# Patient Record
Sex: FEMALE | Race: WHITE | NOT HISPANIC OR LATINO | Employment: OTHER | URBAN - METROPOLITAN AREA
[De-identification: names, ages, dates, MRNs, and addresses within clinical notes are randomized per-mention and may not be internally consistent; named-entity substitution may affect disease eponyms.]

---

## 2019-06-04 ENCOUNTER — APPOINTMENT (OUTPATIENT)
Dept: GENERAL RADIOLOGY | Facility: HOSPITAL | Age: 74
End: 2019-06-04

## 2019-06-04 ENCOUNTER — APPOINTMENT (OUTPATIENT)
Dept: CT IMAGING | Facility: HOSPITAL | Age: 74
End: 2019-06-04

## 2019-06-04 ENCOUNTER — APPOINTMENT (OUTPATIENT)
Dept: MRI IMAGING | Facility: HOSPITAL | Age: 74
End: 2019-06-04

## 2019-06-04 ENCOUNTER — HOSPITAL ENCOUNTER (INPATIENT)
Facility: HOSPITAL | Age: 74
LOS: 3 days | Discharge: HOME | End: 2019-06-07
Attending: EMERGENCY MEDICINE | Admitting: INTERNAL MEDICINE

## 2019-06-04 DIAGNOSIS — I63.9 ACUTE CVA (CEREBROVASCULAR ACCIDENT) (HCC): Primary | ICD-10-CM

## 2019-06-04 DIAGNOSIS — Z74.09 IMPAIRED MOBILITY AND ADLS: ICD-10-CM

## 2019-06-04 DIAGNOSIS — Z78.9 IMPAIRED MOBILITY AND ADLS: ICD-10-CM

## 2019-06-04 DIAGNOSIS — Z74.09 IMPAIRED FUNCTIONAL MOBILITY, BALANCE, GAIT, AND ENDURANCE: ICD-10-CM

## 2019-06-04 PROBLEM — I10 ESSENTIAL HYPERTENSION: Status: ACTIVE | Noted: 2019-06-04

## 2019-06-04 PROBLEM — K51.919 ULCERATIVE COLITIS WITH COMPLICATION (HCC): Status: ACTIVE | Noted: 2019-06-04

## 2019-06-04 PROBLEM — E11.8 TYPE 2 DIABETES MELLITUS WITH COMPLICATION, WITHOUT LONG-TERM CURRENT USE OF INSULIN (HCC): Status: ACTIVE | Noted: 2019-06-04

## 2019-06-04 PROBLEM — I10 ESSENTIAL HYPERTENSION: Status: RESOLVED | Noted: 2019-06-04 | Resolved: 2019-06-04

## 2019-06-04 LAB
BASOPHILS # BLD AUTO: 0.11 10*3/MM3 (ref 0–0.2)
BASOPHILS NFR BLD AUTO: 1.3 % (ref 0–1.5)
BUN BLDA-MCNC: 19 MG/DL (ref 8–26)
CA-I BLDA-SCNC: 1.26 MMOL/L (ref 1.2–1.32)
CHLORIDE BLDA-SCNC: 103 MMOL/L (ref 98–109)
CO2 BLDA-SCNC: 28 MMOL/L (ref 24–29)
CREAT BLDA-MCNC: 1.3 MG/DL (ref 0.6–1.3)
DEPRECATED RDW RBC AUTO: 44.6 FL (ref 37–54)
EOSINOPHIL # BLD AUTO: 0.4 10*3/MM3 (ref 0–0.4)
EOSINOPHIL NFR BLD AUTO: 4.9 % (ref 0.3–6.2)
ERYTHROCYTE [DISTWIDTH] IN BLOOD BY AUTOMATED COUNT: 13.6 % (ref 12.3–15.4)
GLUCOSE BLDC GLUCOMTR-MCNC: 132 MG/DL (ref 70–130)
GLUCOSE BLDC GLUCOMTR-MCNC: 165 MG/DL (ref 70–130)
HCT VFR BLD AUTO: 39.3 % (ref 34–46.6)
HCT VFR BLDA CALC: 39 % (ref 38–51)
HGB BLD-MCNC: 13 G/DL (ref 12–15.9)
HGB BLDA-MCNC: 13.3 G/DL (ref 12–17)
HOLD SPECIMEN: NORMAL
HOLD SPECIMEN: NORMAL
IMM GRANULOCYTES # BLD AUTO: 0.04 10*3/MM3 (ref 0–0.05)
IMM GRANULOCYTES NFR BLD AUTO: 0.5 % (ref 0–0.5)
INR PPP: 1.2 (ref 0.8–1.2)
LYMPHOCYTES # BLD AUTO: 2.76 10*3/MM3 (ref 0.7–3.1)
LYMPHOCYTES NFR BLD AUTO: 33.5 % (ref 19.6–45.3)
MCH RBC QN AUTO: 29.5 PG (ref 26.6–33)
MCHC RBC AUTO-ENTMCNC: 33.1 G/DL (ref 31.5–35.7)
MCV RBC AUTO: 89.1 FL (ref 79–97)
MONOCYTES # BLD AUTO: 0.59 10*3/MM3 (ref 0.1–0.9)
MONOCYTES NFR BLD AUTO: 7.2 % (ref 5–12)
NEUTROPHILS # BLD AUTO: 4.33 10*3/MM3 (ref 1.7–7)
NEUTROPHILS NFR BLD AUTO: 52.6 % (ref 42.7–76)
PLATELET # BLD AUTO: 300 10*3/MM3 (ref 140–450)
PMV BLD AUTO: 9.8 FL (ref 6–12)
POTASSIUM BLDA-SCNC: 5.2 MMOL/L (ref 3.5–4.9)
PROTHROMBIN TIME: 13.8 SECONDS (ref 12.8–15.2)
RBC # BLD AUTO: 4.41 10*6/MM3 (ref 3.77–5.28)
SODIUM BLDA-SCNC: 141 MMOL/L (ref 138–146)
WBC NRBC COR # BLD: 8.23 10*3/MM3 (ref 3.4–10.8)
WHOLE BLOOD HOLD SPECIMEN: NORMAL
WHOLE BLOOD HOLD SPECIMEN: NORMAL

## 2019-06-04 PROCEDURE — 85610 PROTHROMBIN TIME: CPT

## 2019-06-04 PROCEDURE — 82962 GLUCOSE BLOOD TEST: CPT

## 2019-06-04 PROCEDURE — 93005 ELECTROCARDIOGRAM TRACING: CPT | Performed by: EMERGENCY MEDICINE

## 2019-06-04 PROCEDURE — 0042T HC CT CEREBRAL PERFUSION W/WO CONTRAST: CPT

## 2019-06-04 PROCEDURE — 25010000002 ALTEPLASE PER 1 MG: Performed by: EMERGENCY MEDICINE

## 2019-06-04 PROCEDURE — 71045 X-RAY EXAM CHEST 1 VIEW: CPT

## 2019-06-04 PROCEDURE — 85025 COMPLETE CBC W/AUTO DIFF WBC: CPT | Performed by: EMERGENCY MEDICINE

## 2019-06-04 PROCEDURE — 80047 BASIC METABLC PNL IONIZED CA: CPT

## 2019-06-04 PROCEDURE — 63710000001 INSULIN LISPRO (HUMAN) PER 5 UNITS: Performed by: NURSE PRACTITIONER

## 2019-06-04 PROCEDURE — 70450 CT HEAD/BRAIN W/O DYE: CPT

## 2019-06-04 PROCEDURE — 99285 EMERGENCY DEPT VISIT HI MDM: CPT

## 2019-06-04 PROCEDURE — 99291 CRITICAL CARE FIRST HOUR: CPT | Performed by: INTERNAL MEDICINE

## 2019-06-04 PROCEDURE — 3E03317 INTRODUCTION OF OTHER THROMBOLYTIC INTO PERIPHERAL VEIN, PERCUTANEOUS APPROACH: ICD-10-PCS | Performed by: EMERGENCY MEDICINE

## 2019-06-04 PROCEDURE — 70551 MRI BRAIN STEM W/O DYE: CPT

## 2019-06-04 PROCEDURE — 0 IOPAMIDOL PER 1 ML: Performed by: EMERGENCY MEDICINE

## 2019-06-04 PROCEDURE — 85014 HEMATOCRIT: CPT

## 2019-06-04 RX ORDER — ACETAMINOPHEN 325 MG/1
650 TABLET ORAL EVERY 4 HOURS PRN
Status: DISCONTINUED | OUTPATIENT
Start: 2019-06-04 | End: 2019-06-07

## 2019-06-04 RX ORDER — GEMFIBROZIL 600 MG/1
600 TABLET, FILM COATED ORAL
COMMUNITY
End: 2019-06-07 | Stop reason: HOSPADM

## 2019-06-04 RX ORDER — BISACODYL 10 MG
10 SUPPOSITORY, RECTAL RECTAL DAILY PRN
Status: DISCONTINUED | OUTPATIENT
Start: 2019-06-04 | End: 2019-06-07

## 2019-06-04 RX ORDER — DOCUSATE SODIUM 100 MG/1
100 CAPSULE, LIQUID FILLED ORAL DAILY
Status: DISCONTINUED | OUTPATIENT
Start: 2019-06-04 | End: 2019-06-05

## 2019-06-04 RX ORDER — HYOSCYAMINE SULFATE EXTENDED-RELEASE 0.38 MG/1
0.38 TABLET ORAL DAILY
Status: ON HOLD | COMMUNITY
End: 2019-06-05

## 2019-06-04 RX ORDER — MEMANTINE HYDROCHLORIDE 10 MG/1
10 TABLET ORAL 2 TIMES DAILY
COMMUNITY

## 2019-06-04 RX ORDER — SODIUM CHLORIDE 9 MG/ML
100 INJECTION, SOLUTION INTRAVENOUS ONCE
Status: DISCONTINUED | OUTPATIENT
Start: 2019-06-04 | End: 2019-06-07 | Stop reason: HOSPADM

## 2019-06-04 RX ORDER — SODIUM CHLORIDE 0.9 % (FLUSH) 0.9 %
10 SYRINGE (ML) INJECTION AS NEEDED
Status: DISCONTINUED | OUTPATIENT
Start: 2019-06-04 | End: 2019-06-07

## 2019-06-04 RX ORDER — NICOTINE POLACRILEX 4 MG
15 LOZENGE BUCCAL
Status: DISCONTINUED | OUTPATIENT
Start: 2019-06-04 | End: 2019-06-05

## 2019-06-04 RX ORDER — METOPROLOL TARTRATE 50 MG/1
50 TABLET, FILM COATED ORAL 2 TIMES DAILY
COMMUNITY

## 2019-06-04 RX ORDER — TIMOLOL MALEATE 5 MG/ML
1 SOLUTION/ DROPS OPHTHALMIC NIGHTLY
COMMUNITY

## 2019-06-04 RX ORDER — ALUMINA, MAGNESIA, AND SIMETHICONE 2400; 2400; 240 MG/30ML; MG/30ML; MG/30ML
7.5 SUSPENSION ORAL EVERY 4 HOURS PRN
Status: DISCONTINUED | OUTPATIENT
Start: 2019-06-04 | End: 2019-06-05

## 2019-06-04 RX ORDER — ONDANSETRON 2 MG/ML
4 INJECTION INTRAMUSCULAR; INTRAVENOUS EVERY 6 HOURS PRN
Status: DISCONTINUED | OUTPATIENT
Start: 2019-06-04 | End: 2019-06-07

## 2019-06-04 RX ORDER — ALBUTEROL SULFATE 90 UG/1
2 AEROSOL, METERED RESPIRATORY (INHALATION) EVERY 4 HOURS PRN
COMMUNITY

## 2019-06-04 RX ORDER — ATORVASTATIN CALCIUM 40 MG/1
80 TABLET, FILM COATED ORAL NIGHTLY
Status: DISCONTINUED | OUTPATIENT
Start: 2019-06-04 | End: 2019-06-07 | Stop reason: HOSPADM

## 2019-06-04 RX ORDER — ASPIRIN 325 MG
325 TABLET ORAL DAILY
Status: DISCONTINUED | OUTPATIENT
Start: 2019-06-05 | End: 2019-06-07 | Stop reason: HOSPADM

## 2019-06-04 RX ORDER — ACETAMINOPHEN 650 MG/1
650 SUPPOSITORY RECTAL EVERY 4 HOURS PRN
Status: DISCONTINUED | OUTPATIENT
Start: 2019-06-04 | End: 2019-06-05

## 2019-06-04 RX ORDER — DIAZEPAM 5 MG/1
5 TABLET ORAL NIGHTLY PRN
COMMUNITY

## 2019-06-04 RX ORDER — SODIUM CHLORIDE 0.9 % (FLUSH) 0.9 %
3-10 SYRINGE (ML) INJECTION AS NEEDED
Status: DISCONTINUED | OUTPATIENT
Start: 2019-06-04 | End: 2019-06-07

## 2019-06-04 RX ORDER — ASPIRIN 600 MG/1
300 SUPPOSITORY RECTAL DAILY
Status: DISCONTINUED | OUTPATIENT
Start: 2019-06-05 | End: 2019-06-07

## 2019-06-04 RX ORDER — DEXTROSE MONOHYDRATE 25 G/50ML
25 INJECTION, SOLUTION INTRAVENOUS
Status: DISCONTINUED | OUTPATIENT
Start: 2019-06-04 | End: 2019-06-05

## 2019-06-04 RX ORDER — PHENOL 1.4 %
1200 AEROSOL, SPRAY (ML) MUCOUS MEMBRANE DAILY
COMMUNITY

## 2019-06-04 RX ORDER — TIMOLOL MALEATE 5 MG/ML
1 SOLUTION/ DROPS OPHTHALMIC DAILY
Status: DISCONTINUED | OUTPATIENT
Start: 2019-06-05 | End: 2019-06-07 | Stop reason: HOSPADM

## 2019-06-04 RX ORDER — SODIUM CHLORIDE 0.9 % (FLUSH) 0.9 %
3 SYRINGE (ML) INJECTION EVERY 12 HOURS SCHEDULED
Status: DISCONTINUED | OUTPATIENT
Start: 2019-06-04 | End: 2019-06-07 | Stop reason: HOSPADM

## 2019-06-04 RX ORDER — CHLORDIAZEPOXIDE HYDROCHLORIDE AND CLIDINIUM BROMIDE 5; 2.5 MG/1; MG/1
1 CAPSULE ORAL 2 TIMES DAILY
Status: ON HOLD | COMMUNITY
End: 2019-06-05

## 2019-06-04 RX ADMIN — TIMOLOL MALEATE 1 DROP: 5 SOLUTION/ DROPS OPHTHALMIC at 23:07

## 2019-06-04 RX ADMIN — SODIUM CHLORIDE, PRESERVATIVE FREE 3 ML: 5 INJECTION INTRAVENOUS at 23:08

## 2019-06-04 RX ADMIN — INSULIN LISPRO 2 UNITS: 100 INJECTION, SOLUTION INTRAVENOUS; SUBCUTANEOUS at 23:06

## 2019-06-04 RX ADMIN — SODIUM CHLORIDE 1000 ML: 9 INJECTION, SOLUTION INTRAVENOUS at 18:26

## 2019-06-04 RX ADMIN — IOPAMIDOL 40 ML: 755 INJECTION, SOLUTION INTRAVENOUS at 17:43

## 2019-06-04 RX ADMIN — ALTEPLASE 5.09 MG: KIT at 17:59

## 2019-06-04 RX ADMIN — ALTEPLASE 45.85 MG: KIT at 18:00

## 2019-06-04 RX ADMIN — ATORVASTATIN CALCIUM 80 MG: 40 TABLET, FILM COATED ORAL at 23:06

## 2019-06-04 NOTE — ED PROVIDER NOTES
Subjective   Isabel Neil is a 74 y.o.female who presents to the ED with complaints of a neurologic problem. Her last time known well is 1615. During this time, she developed right sided weakness and difficulty speaking. En route to the emergency department, EMS reports her symptoms began to improve. She has not experienced any facial asymmetry. There are no other complaints at this time.         History provided by:  Patient and EMS personnel  Neurologic Problem   The patient's primary symptoms include weakness (right sided). This is a new problem. The current episode started today. The neurological problem developed suddenly. The last time the patient was known to be well was 6/4/2019 4:15 PM.  The problem has been gradually improving since onset. There was right-sided, lower extremity and upper extremity focality noted. Past treatments include nothing. The treatment provided no relief.       Review of Systems   Neurological: Positive for speech difficulty and weakness (right sided). Negative for facial asymmetry.   All other systems reviewed and are negative.      Past Medical History:   Diagnosis Date   • Anxiety    • Blindness of right eye     Post trauma   • Colon polyp    • COPD (chronic obstructive pulmonary disease) (CMS/HCC)    • CVA (cerebral vascular accident) (CMS/HCC) 6/4/2019   • Dementia    • Depression    • Diabetes mellitus (CMS/HCC)    • Dyslipidemia    • History of irregular heartbeat    • Ulcerative colitis (CMS/HCC)        Allergies   Allergen Reactions   • Sulfa Antibiotics Unknown (See Comments)     Unknown        Past Surgical History:   Procedure Laterality Date   • COLONOSCOPY W/ POLYPECTOMY  2018   • HYSTERECTOMY         Family History   Problem Relation Age of Onset   • Alzheimer's disease Mother    • Diabetes Mother    • Breast cancer Sister    • Heart attack Brother    • Alzheimer's disease Maternal Grandmother    • Coronary artery disease Sister    • Diabetes Sister    • Valvular  heart disease Sister    • Lung disease Brother    • Melanoma Brother    • No Known Problems Brother        Social History     Socioeconomic History   • Marital status:      Spouse name: Not on file   • Number of children: 2   • Years of education: Not on file   • Highest education level: Not on file   Occupational History   • Occupation: Retired    Tobacco Use   • Smoking status: Former Smoker     Packs/day: 1.00     Years: 50.00     Pack years: 50.00     Types: Cigarettes     Last attempt to quit: 6/4/2009     Years since quitting: 10.0   • Smokeless tobacco: Never Used   Substance and Sexual Activity   • Alcohol use: No     Frequency: Never   Social History Narrative    Lives with her significant other.  Has a son and a daughter         Objective   Physical Exam   Constitutional: She is oriented to person, place, and time. She appears well-developed and well-nourished. No distress.   HENT:   Head: Normocephalic and atraumatic.   Nose: Nose normal.   Eyes: Conjunctivae are normal. No scleral icterus.   Neck: Normal range of motion. Neck supple.   Cardiovascular: Normal rate, regular rhythm, normal heart sounds and intact distal pulses.   No murmur heard.  Pulmonary/Chest: Effort normal and breath sounds normal. No respiratory distress.   Abdominal: Soft. Bowel sounds are normal. There is no tenderness.   Musculoskeletal: Normal range of motion. She exhibits no edema.   Neurological: She is alert and oriented to person, place, and time.   Weak  to right upper extremity. Poor coordination with finger to nose on right side. Unable to raise right lower extremity up off the bed.    Skin: Skin is warm and dry.   Psychiatric: She has a normal mood and affect. Her behavior is normal.   Nursing note and vitals reviewed.      Critical Care  Performed by: Nathan Fam MD  Authorized by: Christo Wooten MD     Critical care provider statement:     Critical care time (minutes):  35    Critical care  was necessary to treat or prevent imminent or life-threatening deterioration of the following conditions:  CNS failure or compromise    Critical care was time spent personally by me on the following activities:  Discussions with consultants, evaluation of patient's response to treatment, examination of patient, re-evaluation of patient's condition, ordering and review of radiographic studies, ordering and review of laboratory studies and ordering and performing treatments and interventions             ED Course  ED Course as of Jun 06 2310   Tue Jun 04, 2019   1721 Bedside evaluating the patient.   [TB]   1724 Her last time known normal is 1615.   [TB]   1729 Reviewed CT with radiology at the bedside.   [TB]   1738 I spoke with Mrs. Neil about risks and benefits of TPA and she wants me to talk to her daughter who is a nurse prior to giving consent.  She tells me her daughter is on the way here.  I have gone to our lobby and called for her daughter and she is not yet here.  While we are awaiting her arrival I have paged to Dr. Ko who is on-call for the stroke team.  At this point I think she would be a good candidate for IV TPA.  [DT]   1740 I have reviewed bedside labs and GFR is 39, I have asked our CT tech to perform a perfusion study  [DT]   1743 Discussed with Dr. Ko, neurology, who recommends tPA.   [TB]   1746 I spoke with Mrs. Neil further.  She denies any surgeries or injections to her spine within the last several months.  She denies blood in her urine or stool.  She tells me she has had problems with anemia however today her hemoglobin is 13.  [DT]   1746 Her daughter is here and is a nurse.  I spoken with her and she asks that we go ahead and give TPA.  I have instructed the nurses to give it  [DT]   1747 Discussed with Mrs. Neil's daughter, who consents to tPA. She also confirms her last known well of 1615.   [TB]   1808 CT perfusion study shows no large vessel occlusion.  TPA is  infusing.  I have paged Dr. Liam Watt who is on-call for the ICU  [DT]   1815 Repeat exam Mrs. Neil is able to perform finger-to-nose testing with the right upper extremity and is more coordinated than on my initial exam.  She still is unable to raise her right leg off the bed.  She is tolerating the TPA infusion.  Pressure is 110, will bolus a liter of fluids  [DT]   1816 Thrombolytic Therapy for Stroke  Time: 6:18 PM    Inclusion criteria:  Clinical diagnosis of ischemic stroke causing measurable neurologic deficit. Onset of symptoms < 4.5 hours before beginning treatment. Last known well time < 4.5 hours before beginning treatment. Pt is at least 18 years of age.    Exclusion criteria:  Historical: None.  Clinical: None.  Hematologic: None.  Head CT scan: None.  Relative exclusion criteria: None.  Additional relative exclusion criteria for treatment from 3-4.5 hours from symptom onset: None.    No exclusion criteria present.  Current inclusion criteria have been reviewed and met.    tPA given. Intensive monitoring performed:  blood pressure, cardiac monitoring, O2 sat and neuro exam.  Complications: None.   [TB]   1822 D/w dr mon who will admit  [DT]      ED Course User Index  [DT] Ntahan Fam MD  [TB] Brandon Sahni     Recent Results (from the past 24 hour(s))   Basic Metabolic Panel    Collection Time: 06/06/19  4:25 AM   Result Value Ref Range    Glucose 112 (H) 65 - 99 mg/dL    BUN 18 8 - 23 mg/dL    Creatinine 0.85 0.57 - 1.00 mg/dL    Sodium 142 136 - 145 mmol/L    Potassium 3.8 3.5 - 5.2 mmol/L    Chloride 104 98 - 107 mmol/L    CO2 25.0 22.0 - 29.0 mmol/L    Calcium 10.0 8.6 - 10.5 mg/dL    eGFR Non African Amer 65 >60 mL/min/1.73    BUN/Creatinine Ratio 21.2 7.0 - 25.0    Anion Gap 13.0 mmol/L   CBC Auto Differential    Collection Time: 06/06/19  4:25 AM   Result Value Ref Range    WBC 8.17 3.40 - 10.80 10*3/mm3    RBC 4.44 3.77 - 5.28 10*6/mm3    Hemoglobin 12.8 12.0 - 15.9 g/dL     Hematocrit 39.4 34.0 - 46.6 %    MCV 88.7 79.0 - 97.0 fL    MCH 28.8 26.6 - 33.0 pg    MCHC 32.5 31.5 - 35.7 g/dL    RDW 13.7 12.3 - 15.4 %    RDW-SD 44.5 37.0 - 54.0 fl    MPV 10.1 6.0 - 12.0 fL    Platelets 278 140 - 450 10*3/mm3    Neutrophil % 46.9 42.7 - 76.0 %    Lymphocyte % 36.6 19.6 - 45.3 %    Monocyte % 9.7 5.0 - 12.0 %    Eosinophil % 5.6 0.3 - 6.2 %    Basophil % 1.0 0.0 - 1.5 %    Immature Grans % 0.2 0.0 - 0.5 %    Neutrophils, Absolute 3.83 1.70 - 7.00 10*3/mm3    Lymphocytes, Absolute 2.99 0.70 - 3.10 10*3/mm3    Monocytes, Absolute 0.79 0.10 - 0.90 10*3/mm3    Eosinophils, Absolute 0.46 (H) 0.00 - 0.40 10*3/mm3    Basophils, Absolute 0.08 0.00 - 0.20 10*3/mm3    Immature Grans, Absolute 0.02 0.00 - 0.05 10*3/mm3   POC Glucose Once    Collection Time: 06/06/19  7:17 AM   Result Value Ref Range    Glucose 114 70 - 130 mg/dL   Troponin    Collection Time: 06/06/19 11:17 AM   Result Value Ref Range    Troponin T <0.010 0.000 - 0.030 ng/mL   POC Glucose Once    Collection Time: 06/06/19 11:25 AM   Result Value Ref Range    Glucose 117 70 - 130 mg/dL   Doppler Transcranial Microbubble Injection CAR    Collection Time: 06/06/19  4:30 PM   Result Value Ref Range    BSA 1.6 m^2     CV ECHO AJAY - BZI_BMI 20.6 kilograms/m^2     CV ECHO AJAY - BSA(HAYCOCK) 1.6 m^2     CV ECHO AJAY - BZI_METRIC_WEIGHT 56.2 kg     CV ECHO AJAY - BZI_METRIC_HEIGHT 165.1 cm    Left Terminal ICA 19 cm/sec    Left P1 22 cm/sec    Left distal M1 11 cm/sec    Left Mid M1 15 cm/sec    Left Proximal M1 17 cm/sec    Left P2 12 cm/sec   POC Glucose Once    Collection Time: 06/06/19  4:34 PM   Result Value Ref Range    Glucose 128 70 - 130 mg/dL   POC Glucose Once    Collection Time: 06/06/19  8:17 PM   Result Value Ref Range    Glucose 188 (H) 70 - 130 mg/dL     Note: In addition to lab results from this visit, the labs listed above may include labs taken at another facility or during a different encounter within the last 24  hours. Please correlate lab times with ED admission and discharge times for further clarification of the services performed during this visit.    CT Head Without Contrast   Final Result   Chronic appearing changes of the aging brain, including   white matter disease similar to previous brain MRI.. No evidence of   acute intracranial disease.           This report was finalized on 6/5/2019 6:30 PM by DR. Giovani Blanton MD.          CT Cerebral Perfusion With & Without Contrast   Final Result   There is no evidence of core infarct or reversible ischemia.       D:  06/04/2019   E:  06/05/2019           This report was finalized on 6/5/2019 9:21 AM by Dr. Geoffrey Sosa MD.          XR Chest 1 View   Final Result   Chronic change; no acute disease.       D:  06/04/2019   E:  06/05/2019       This report was finalized on 6/5/2019 9:21 AM by Dr. Geoffrey Sosa MD.          MRI Brain Without Contrast   Final Result   Moderate atrophy with moderate chronic ischemic changes around the ventricles. No evidence of recent infarct.      Signer Name: Nawaf Malin MD    Signed: 6/4/2019 9:50 PM    Workstation Name: [id number]          CT Head Without Contrast Stroke Protocol   Final Result   Age-appropriate atrophy and fairly extensive but chronic   appearing central white matter changes. No evidence of intracranial   hemorrhage, no well-defined infarct, or other clearly acute renal   disease.               Note: Exam time is shown as 1727; study was reviewed the CT scan monitor   and discussed with Dr. Fam at 1731.           This report was finalized on 6/4/2019 9:22 PM by DR. Giovani Blanton MD.            Vitals:    06/06/19 1800 06/06/19 1900 06/06/19 2000 06/06/19 2200   BP: 124/71 151/75 122/63 141/78   BP Location:   Right arm Right arm   Patient Position:   Lying Lying   Pulse: 95 112 105 75   Resp:   16    Temp:   98.6 °F (37 °C)    TempSrc:   Oral    SpO2: 93% 93% 93% 95%   Weight:       Height:         Medications   sodium  chloride 0.9 % flush 10 mL (not administered)   sodium chloride 0.9 % infusion (not administered)   timolol (TIMOPTIC) 0.5 % ophthalmic solution 1 drop (1 drop Both Eyes Given 6/6/19 2002)   sodium chloride 0.9 % flush 3 mL (3 mL Intravenous Given 6/6/19 2003)   sodium chloride 0.9 % flush 3-10 mL (not administered)   atorvastatin (LIPITOR) tablet 80 mg (80 mg Oral Given 6/6/19 2002)   aspirin tablet 325 mg (325 mg Oral Given 6/6/19 0812)     Or   aspirin suppository 300 mg ( Rectal Not Given:  See Alt 6/6/19 0812)   acetaminophen (TYLENOL) tablet 650 mg (not administered)   ondansetron (ZOFRAN) injection 4 mg (not administered)   bisacodyl (DULCOLAX) suppository 10 mg (not administered)   insulin lispro (humaLOG) injection 0-7 Units (2 Units Subcutaneous Given 6/6/19 2018)   sertraline (ZOLOFT) tablet 50 mg (50 mg Oral Given 6/6/19 0812)   metoprolol tartrate (LOPRESSOR) tablet 50 mg (50 mg Oral Given 6/6/19 2002)   insulin detemir (LEVEMIR) injection 15 Units (15 Units Subcutaneous Given 6/6/19 2002)   insulin lispro (humaLOG) injection 5 Units (5 Units Subcutaneous Given 6/6/19 1738)   iopamidol (ISOVUE-370) 76 % injection 150 mL (40 mL Intravenous Given 6/4/19 1743)   alteplase (ACTIVASE) bolus from vial (5.09 mg Intravenous Given 6/4/19 1759)   alteplase (ACTIVASE) 100 mg kit (0 mg/kg × 56.6 kg Intravenous Stopped 6/4/19 1847)   sodium chloride 0.9 % bolus 1,000 mL (0 mL Intravenous Stopped 6/4/19 1910)     ECG/EMG Results (last 24 hours)     ** No results found for the last 24 hours. **        ECG 12 Lead                             MDM  Number of Diagnoses or Management Options  Acute CVA (cerebrovascular accident) (CMS/HCC): new and requires workup     Amount and/or Complexity of Data Reviewed  Clinical lab tests: reviewed and ordered  Tests in the radiology section of CPT®: ordered and reviewed  Discussion of test results with the performing providers: yes  Review and summarize past medical records:  yes  Discuss the patient with other providers: yes  Independent visualization of images, tracings, or specimens: yes    Critical Care  Total time providing critical care: 30-74 minutes    Patient Progress  Patient progress: improved      Final diagnoses:   Acute CVA (cerebrovascular accident) (CMS/[facility], located within [facility])       Documentation assistance provided by marcello Sahni.  Information recorded by the scribe was done at my direction and has been verified and validated by me.     Brandon Sahni  06/04/19 2876       Nathan Fam MD  06/06/19 [id number]

## 2019-06-04 NOTE — H&P
ICU ADMISSION NOTE    Chief complaint     Right-sided weakness  Ischemic stroke      Subjective     Patient is a 74 y.o. female presents with onset of right-sided weakness and difficulty speaking while standing at the sink washing dishes.  Everybody was ready to sit down at the table but she could not move from the sink because she could not move her leg.  Her significant other helped her to the chair and an ambulance was called.  Onset of symptoms around 1615.  EMS reported her symptoms began to improve.  CT scan of the head, CT perfusion negative for a well-defined infarct.  She had extensive central white matter disease.  She could not raise her right leg and her right upper extremity was weak.  TPA initiated at 1759.  Patient ready feeling improved strength in her upper extremity.  Speech has cleared.    Review of Systems  Review of Systems   Constitutional: Positive for activity change.   HENT: Positive for congestion and postnasal drip. Negative for trouble swallowing.    Eyes: Positive for visual disturbance.        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    Cardiovascular: Negative for chest pain, palpitations and leg swelling.   Gastrointestinal: Positive for diarrhea. Negative for abdominal pain.   Endocrine: Negative for cold intolerance and polyuria.   Genitourinary: Negative for dysuria.   Musculoskeletal: Positive for arthralgias and gait problem.   Skin: Negative.    Allergic/Immunologic: Positive for environmental allergies.   Neurological: Positive for speech difficulty and weakness.   Hematological: Negative for adenopathy. Does not bruise/bleed easily.   Psychiatric/Behavioral: Positive for dysphoric mood. The patient is nervous/anxious.         Home Medications    (Not in a hospital admission)  albuterol sulfate  (90 Base) MCG/ACT inhaler    --  --     calcium carbonate (OS-VU) 600 MG tablet    --  --    clidinium-chlordiazePOXIDE  "(LIBRAX) 5-2.5 MG per capsule    --  --    diazePAM (VALIUM) 5 MG tablet    --  --    gemfibrozil (LOPID) 600 MG tablet    --  --    hyoscyamine (LEVBID) 0.375 MG 12 hr tablet    --  --    memantine (NAMENDA) 10 MG tablet    --  --    metFORMIN (GLUCOPHAGE) 1000 MG tablet    --  --    metoprolol tartrate (LOPRESSOR) 50 MG tablet    --  --    nystatin (MYCOSTATIN) 167698 UNIT/ML suspension    --  --    sertraline (ZOLOFT) 50 MG tablet    --  --    timolol (TIMOPTIC) 0.5 % ophthalmic solution           History  Past Medical History:   Diagnosis Date   • Anxiety    • Blindness of right eye     Post trauma   • Colon polyp    • COPD (chronic obstructive pulmonary disease) (CMS/[facility])    • CVA (cerebral vascular accident) (CMS/[facility]) 6/4/2019   • Depression    • Diabetes mellitus (CMS/[facility])    • History of irregular heartbeat    • Ulcerative colitis (CMS/[facility])      Past Surgical History:   Procedure Laterality Date   • COLONOSCOPY W/ POLYPECTOMY  2018   • HYSTERECTOMY       Family History   Problem Relation Age of Onset   • Alzheimer's disease Mother    • Diabetes Mother    • Breast cancer Sister    • Heart attack Brother    • Alzheimer's disease Maternal Grandmother    • Coronary artery disease Sister    • Diabetes Sister    • Valvular heart disease Sister    • Lung disease Brother    • Melanoma Brother    • No Known Problems Brother      Social History     Tobacco Use   • Smoking status: Former Smoker     Packs/day: 1.00     Years: 50.00     Pack years: 50.00     Types: Cigarettes     Last attempt to quit: 6/4/2009     Years since quitting: 10.0   • Smokeless tobacco: Never Used   Substance Use Topics   • Alcohol use: No     Frequency: Never   • Drug use: Not on file       (Not in a hospital admission)  Allergies:  Sulfa antibiotics      Objective     Vital Signs  Blood pressure 158/86, pulse 102, temperature 99.3 °F (37.4 °C), resp. rate 16, height 165.1 cm (65\"), weight 56.6 kg (124 lb 12.5 oz), SpO2 93 %.    Physical " Exam:  General Appearance:   Thin elderly white woman in no distress   Head:   Normocephalic, atraumatic   Eyes:          Right pupil dilated, right eye deviates laterally, left pupil 4 mm and reactive   Ears:     Throat:  Oral mucosa dry   Neck:  Trachea midline, no carotid bruit   Back:      Lungs:    Symmetric chest expansion without wheeze or rhonchi    Heart:   Regular, S1, S2 auscultated, soft systolic murmur   Abdomen:    Bowel sounds present, nondistended, nontender   Rectal:     Deferred   Extremities:    No pitting edema or cyanosis.  No calf tenderness   Pulses:  Pedal pulses present   Skin:  Warm and dry   Lymph nodes:  No cervical adenopathy   Neurologic:  Alert, oriented to person, hospital, year.  Face symmetric, tongue midline, no sensory deficit.  No motor drift upper extremities.  Unable to raise her right leg off of the stretcher, normal strength left leg.  Speech fluent       Results Review:   Lab Results (last 24 hours)     Procedure Component Value Units Date/Time    [place] Draw [[id number]] Collected:  06/04/19 1738    Specimen:  Blood Updated:  06/04/19 5065    Narrative:       The following orders were created for panel order [place] Draw.  Procedure                               Abnormality         Status                     ---------                               -----------         ------                     Light Blue Top[[id number]]                                   Final result               Green Top (Gel)[[id number]]                                  Final result               Lavender Top[[id number]]                                     Final result               Gold Top - SST[[id number]]                                   Final result               Green Top (No Gel)[[id number]]                                                            Please view results for these tests on the individual orders.    Light Blue Top [[id number]] Collected:  06/04/19 1738    Specimen:  Blood Updated:   06/04/19 1845     Extra Tube hold for add-on     Comment: Auto resulted       Green Top (Gel) [[id number]] Collected:  06/04/19 1739    Specimen:  Blood Updated:  06/04/19 1845     Extra Tube Hold for add-ons.     Comment: Auto resulted.       Lavender Top [[id number]] Collected:  06/04/19 1739    Specimen:  Blood Updated:  06/04/19 1845     Extra Tube hold for add-on     Comment: Auto resulted       Gold Top - SST [[id number]] Collected:  06/04/19 1739    Specimen:  Blood Updated:  06/04/19 1845     Extra Tube Hold for add-ons.     Comment: Auto resulted.       CBC & Differential [[id number]] Collected:  06/04/19 1739    Specimen:  Blood Updated:  06/04/19 1752    Narrative:       The following orders were created for panel order CBC & Differential.  Procedure                               Abnormality         Status                     ---------                               -----------         ------                     CBC Auto Differential[[id number]]        Normal              Final result                 Please view results for these tests on the individual orders.    CBC Auto Differential [[id number]]  (Normal) Collected:  06/04/19 1739    Specimen:  Blood Updated:  06/04/19 1752     WBC 8.23 10*3/mm3      RBC 4.41 10*6/mm3      Hemoglobin 13.0 g/dL      Hematocrit 39.3 %      MCV 89.1 fL      MCH 29.5 pg      MCHC 33.1 g/dL      RDW 13.6 %      RDW-SD 44.6 fl      MPV 9.8 fL      Platelets 300 10*3/mm3      Neutrophil % 52.6 %      Lymphocyte % 33.5 %      Monocyte % 7.2 %      Eosinophil % 4.9 %      Basophil % 1.3 %      Immature Grans % 0.5 %      Neutrophils, Absolute 4.33 10*3/mm3      Lymphocytes, Absolute 2.76 10*3/mm3      Monocytes, Absolute 0.59 10*3/mm3      Eosinophils, Absolute 0.40 10*3/mm3      Basophils, Absolute 0.11 10*3/mm3      Immature Grans, Absolute 0.04 10*3/mm3     POC Protime / INR [[id number]]  (Normal) Collected:  06/04/19 1735    Specimen:  Blood Updated:  06/04/19 1741     Protime 13.8  seconds      INR 1.2     Comment: Serial Number: [id number]:  [id number]       POC CHEM 8 [[id number]]  (Abnormal) Collected:  06/04/19 1736    Specimen:  Blood Updated:  06/04/19 1740     Glucose 132 mg/dL      BUN 19 mg/dL      Creatinine 1.30 mg/dL      Sodium 141 mmol/L      Potassium 5.2 mmol/L      Chloride 103 mmol/L      Total CO2 28 mmol/L      Hemoglobin 13.3 g/dL      Comment: Serial Number: [id number]:  [id number]        Hematocrit 39 %      Ionized Calcium 1.26 mmol/L         Imaging Results (last 24 hours)     Procedure Component Value Units Date/Time    XR Chest 1 View [[id number]] Collected:  06/04/19 1814     Updated:  06/04/19 1815    Narrative:          EXAMINATION: XR CHEST 1 VW-      INDICATION: Stroke Protocol (Onset > 12 Hrs); I63.9-Cerebral infarction,  unspecified      COMPARISON: NONE     FINDINGS: There is a normal cardiac silhouette. There is linear change  in the right midlung region with hilar and parenchymal calcifications  consistent with old granulomatous disease. There is no acute  inflammatory process, mass or effusion.           Impression:       Chronic change; no acute disease          CT Cerebral Perfusion With & Without Contrast [[id number]] Collected:  06/04/19 1752     Updated:  06/04/19 1754    Narrative:       EXAMINATION: CT CEREBRAL PERFUSION W WO CONTRAST-      INDICATION: TIA, initial screening; I63.9-Cerebral infarction,  unspecified     TECHNIQUE: CT perfusion imaging was performed with without intravenous  contrast with data acquisition regarding blood flow, blood volume and  transit time.     The radiation dose reduction device was turned on for each scan per the  ALARA (As Low as Reasonably Achievable) protocol.     COMPARISON: NONE     FINDINGS: Blood flow images demonstrate no abnormality. Likewise the  MAXIMUM TEMPERATURE and blood volume images are normal.             Impression:       There is no evidence of core infarct or reversible ischemia.          CT Head  Without Contrast Stroke Protocol [[id number]] Collected:  06/04/19 1733     Updated:  06/04/19 1734    Narrative:       EXAMINATION: CT HEAD WO CONTRAST STROKE PROTOCOL-      INDICATION: Stroke     TECHNIQUE: 5 mm unenhanced images through the brain     The radiation dose reduction device was turned on for each scan per the  ALARA (As Low as Reasonably Achievable) protocol.     COMPARISON: NONE     FINDINGS: Patient history indicates right-sided weakness. The calvarium  appears intact. Included paranasal sinuses mastoids appear clear. Soft  tissue window images show expected degree of generalized cerebral  atrophy for age. There is fairly extensive central white matter disease,  slightly denser in the central right frontal white matter, and posterior  left frontal white matter than elsewhere, but there is no well-defined  infarct, no evidence of hemorrhage, hydrocephalus, mass or mass effect,  or abnormal extra-axial collection.                   Impression:       Age-appropriate atrophy and fairly extensive but chronic  appearing central white matter changes. No evidence of intracranial  hemorrhage, no well-defined infarct, or other clearly acute renal  disease.           Note: Exam time is shown as 17 2017 study was reviewed the CT scan  monitor and discussed with Dr. Fam at 1731.                 PROBLEM LIST    Patient Active Problem List   Diagnosis   • CVA (cerebral vascular accident) (CMS/HCC)   • Type 2 diabetes mellitus with complication, without long-term current use of insulin (CMS/HCC)   • Ulcerative colitis with complication (CMS/HCC)       Assessment/Plan     74-year-old woman with diabetes, ulcerative colitis with chronic diarrhea, anxiety on chronic benzo diazepam presenting with the abrupt onset of right-sided weakness.  She received TPA.  CT scan did not reveal any large vessel occlusion.  Risk factors include previous tobacco use, diabetes, possible dyslipidemia as she takes a cholesterol  medication.  Neurologic symptoms have already started to improve particularly in her upper extremity.  Blood glucose on admission was 132.  She was taking metformin.  She denied a history of high blood pressure but does take metoprolol    Monitor NIH stroke scale  Follow-up CT scan in 24 hours  Neurology to assess  Maintain systolic blood pressure less than 185  Sliding scale insulin  As needed nebulized bronchodilators  Dysphagia evaluation  Resume antidepressives and anxiety medications  Aspirin, statin  GI soft diet  Hemoglobin A1c  Jose Guadalupe Anders MD  06/04/19  7:37 PM    Time: 50min    This note was produced with a voice recognition program and may have uncorrected errors.

## 2019-06-05 ENCOUNTER — APPOINTMENT (OUTPATIENT)
Dept: CARDIOLOGY | Facility: HOSPITAL | Age: 74
End: 2019-06-05

## 2019-06-05 ENCOUNTER — APPOINTMENT (OUTPATIENT)
Dept: CT IMAGING | Facility: HOSPITAL | Age: 74
End: 2019-06-05

## 2019-06-05 PROBLEM — I63.9 ACUTE CVA (CEREBROVASCULAR ACCIDENT) (HCC): Status: ACTIVE | Noted: 2019-06-05

## 2019-06-05 LAB
ALBUMIN SERPL-MCNC: 4.3 G/DL (ref 3.5–5.2)
ALBUMIN/GLOB SERPL: 1.3 G/DL
ALP SERPL-CCNC: 104 U/L (ref 39–117)
ALT SERPL W P-5'-P-CCNC: 5 U/L (ref 1–33)
ANION GAP SERPL CALCULATED.3IONS-SCNC: 13 MMOL/L
AST SERPL-CCNC: 16 U/L (ref 1–32)
BH CV ECHO MEAS - AO MAX PG (FULL): 5.8 MMHG
BH CV ECHO MEAS - AO MAX PG: 9.2 MMHG
BH CV ECHO MEAS - AO ROOT AREA (BSA CORRECTED): 1.9
BH CV ECHO MEAS - AO ROOT AREA: 7.2 CM^2
BH CV ECHO MEAS - AO ROOT DIAM: 3 CM
BH CV ECHO MEAS - AO V2 MAX: 152 CM/SEC
BH CV ECHO MEAS - AVA(V,A): 1.6 CM^2
BH CV ECHO MEAS - AVA(V,D): 1.6 CM^2
BH CV ECHO MEAS - BSA(HAYCOCK): 1.6 M^2
BH CV ECHO MEAS - BSA(HAYCOCK): 1.6 M^2
BH CV ECHO MEAS - BSA: 1.6 M^2
BH CV ECHO MEAS - BSA: 1.7 M^2
BH CV ECHO MEAS - BZI_BMI: 18.3 KILOGRAMS/M^2
BH CV ECHO MEAS - BZI_BMI: 20.6 KILOGRAMS/M^2
BH CV ECHO MEAS - BZI_METRIC_HEIGHT: 165.1 CM
BH CV ECHO MEAS - BZI_METRIC_HEIGHT: 175.3 CM
BH CV ECHO MEAS - BZI_METRIC_WEIGHT: 56.2 KG
BH CV ECHO MEAS - BZI_METRIC_WEIGHT: 56.2 KG
BH CV ECHO MEAS - EDV(CUBED): 74.4 ML
BH CV ECHO MEAS - EDV(MOD-SP2): 35 ML
BH CV ECHO MEAS - EDV(MOD-SP4): 53 ML
BH CV ECHO MEAS - EDV(TEICH): 78.8 ML
BH CV ECHO MEAS - EF(CUBED): 67.4 %
BH CV ECHO MEAS - EF(MOD-BP): 67 %
BH CV ECHO MEAS - EF(MOD-SP2): 62.9 %
BH CV ECHO MEAS - EF(MOD-SP4): 71.7 %
BH CV ECHO MEAS - EF(TEICH): 59.3 %
BH CV ECHO MEAS - ESV(CUBED): 24.3 ML
BH CV ECHO MEAS - ESV(MOD-SP2): 13 ML
BH CV ECHO MEAS - ESV(MOD-SP4): 15 ML
BH CV ECHO MEAS - ESV(TEICH): 32.1 ML
BH CV ECHO MEAS - FS: 31.2 %
BH CV ECHO MEAS - IVS/LVPW: 1
BH CV ECHO MEAS - IVSD: 0.74 CM
BH CV ECHO MEAS - LAD MAJOR: 4.3 CM
BH CV ECHO MEAS - LAT PEAK E' VEL: 10.6 CM/SEC
BH CV ECHO MEAS - LATERAL E/E' RATIO: 10.9
BH CV ECHO MEAS - LV DIASTOLIC VOL/BSA (35-75): 32.8 ML/M^2
BH CV ECHO MEAS - LV MASS(C)D: 90.3 GRAMS
BH CV ECHO MEAS - LV MASS(C)DI: 56 GRAMS/M^2
BH CV ECHO MEAS - LV MAX PG: 3.4 MMHG
BH CV ECHO MEAS - LV SYSTOLIC VOL/BSA (12-30): 9.3 ML/M^2
BH CV ECHO MEAS - LV V1 MAX: 92.5 CM/SEC
BH CV ECHO MEAS - LVIDD: 4.2 CM
BH CV ECHO MEAS - LVIDS: 2.9 CM
BH CV ECHO MEAS - LVLD AP2: 7 CM
BH CV ECHO MEAS - LVLD AP4: 7.2 CM
BH CV ECHO MEAS - LVLS AP2: 6 CM
BH CV ECHO MEAS - LVLS AP4: 5.8 CM
BH CV ECHO MEAS - LVOT AREA (M): 2.5 CM^2
BH CV ECHO MEAS - LVOT AREA: 2.6 CM^2
BH CV ECHO MEAS - LVOT DIAM: 1.8 CM
BH CV ECHO MEAS - LVPWD: 0.72 CM
BH CV ECHO MEAS - MED PEAK E' VEL: 11.3 CM/SEC
BH CV ECHO MEAS - MEDIAL E/E' RATIO: 11.7
BH CV ECHO MEAS - MV DEC TIME: 0.15 SEC
BH CV ECHO MEAS - MV E MAX VEL: 118 CM/SEC
BH CV ECHO MEAS - PULM DIAS VEL: 42.8 CM/SEC
BH CV ECHO MEAS - PULM S/D: 1.6
BH CV ECHO MEAS - PULM SYS VEL: 67.9 CM/SEC
BH CV ECHO MEAS - SI(CUBED): 31 ML/M^2
BH CV ECHO MEAS - SI(MOD-SP2): 13.6 ML/M^2
BH CV ECHO MEAS - SI(MOD-SP4): 23.5 ML/M^2
BH CV ECHO MEAS - SI(TEICH): 29 ML/M^2
BH CV ECHO MEAS - SV(CUBED): 50.1 ML
BH CV ECHO MEAS - SV(MOD-SP2): 22 ML
BH CV ECHO MEAS - SV(MOD-SP4): 38 ML
BH CV ECHO MEAS - SV(TEICH): 46.7 ML
BH CV ECHO MEAS - TAPSE (>1.6): 1.3 CM2
BH CV ECHO MEASUREMENTS AVERAGE E/E' RATIO: 10.78
BH CV VAS BP LEFT ARM: NORMAL MMHG
BH CV XLRA - RV BASE: 2.8 CM
BH CV XLRA - RV LENGTH: 7.1 CM
BH CV XLRA - RV MID: 2.4 CM
BH CV XLRA - TDI S': 9.87 CM/SEC
BH CV XLRA MEAS LEFT CCA RATIO VEL: 58.5 CM/SEC
BH CV XLRA MEAS LEFT DIST CCA EDV: 14.9 CM/SEC
BH CV XLRA MEAS LEFT DIST CCA PSV: 55 CM/SEC
BH CV XLRA MEAS LEFT DIST ICA EDV: 24.9 CM/SEC
BH CV XLRA MEAS LEFT DIST ICA PSV: 86 CM/SEC
BH CV XLRA MEAS LEFT ICA RATIO VEL: 60.9 CM/SEC
BH CV XLRA MEAS LEFT ICA/CCA RATIO: 1
BH CV XLRA MEAS LEFT MID CCA EDV: 13.8 CM/SEC
BH CV XLRA MEAS LEFT MID CCA PSV: 58.9 CM/SEC
BH CV XLRA MEAS LEFT MID ICA EDV: 19.3 CM/SEC
BH CV XLRA MEAS LEFT MID ICA PSV: 58.1 CM/SEC
BH CV XLRA MEAS LEFT PROX CCA EDV: 12.2 CM/SEC
BH CV XLRA MEAS LEFT PROX CCA PSV: 50.7 CM/SEC
BH CV XLRA MEAS LEFT PROX ECA PSV: 141.4 CM/SEC
BH CV XLRA MEAS LEFT PROX ICA EDV: 14.9 CM/SEC
BH CV XLRA MEAS LEFT PROX ICA PSV: 61.3 CM/SEC
BH CV XLRA MEAS LEFT PROX SCLA PSV: 207.4 CM/SEC
BH CV XLRA MEAS LEFT VERTEBRAL A EDV: 20.1 CM/SEC
BH CV XLRA MEAS LEFT VERTEBRAL A PSV: 84.7 CM/SEC
BH CV XLRA MEAS RIGHT CCA RATIO VEL: 67.6 CM/SEC
BH CV XLRA MEAS RIGHT DIST CCA EDV: 16.1 CM/SEC
BH CV XLRA MEAS RIGHT DIST CCA PSV: 58.5 CM/SEC
BH CV XLRA MEAS RIGHT DIST ICA EDV: 24.8 CM/SEC
BH CV XLRA MEAS RIGHT DIST ICA PSV: 83.3 CM/SEC
BH CV XLRA MEAS RIGHT ICA RATIO VEL: 96.2 CM/SEC
BH CV XLRA MEAS RIGHT ICA/CCA RATIO: 1.4
BH CV XLRA MEAS RIGHT MID CCA EDV: 23.2 CM/SEC
BH CV XLRA MEAS RIGHT MID CCA PSV: 68 CM/SEC
BH CV XLRA MEAS RIGHT MID ICA EDV: 25.9 CM/SEC
BH CV XLRA MEAS RIGHT MID ICA PSV: 73.9 CM/SEC
BH CV XLRA MEAS RIGHT PROX CCA EDV: 16.1 CM/SEC
BH CV XLRA MEAS RIGHT PROX CCA PSV: 73.1 CM/SEC
BH CV XLRA MEAS RIGHT PROX ECA PSV: 93.7 CM/SEC
BH CV XLRA MEAS RIGHT PROX ICA EDV: 27 CM/SEC
BH CV XLRA MEAS RIGHT PROX ICA PSV: 96.8 CM/SEC
BH CV XLRA MEAS RIGHT PROX SCLA PSV: 43.2 CM/SEC
BH CV XLRA MEAS RIGHT VERTEBRAL A EDV: 16.7 CM/SEC
BH CV XLRA MEAS RIGHT VERTEBRAL A PSV: 60.4 CM/SEC
BILIRUB SERPL-MCNC: 0.4 MG/DL (ref 0.2–1.2)
BUN BLD-MCNC: 18 MG/DL (ref 8–23)
BUN/CREAT SERPL: 19.6 (ref 7–25)
CALCIUM SPEC-SCNC: 9.6 MG/DL (ref 8.6–10.5)
CHLORIDE SERPL-SCNC: 104 MMOL/L (ref 98–107)
CHOLEST SERPL-MCNC: 133 MG/DL (ref 0–200)
CO2 SERPL-SCNC: 25 MMOL/L (ref 22–29)
CREAT BLD-MCNC: 0.92 MG/DL (ref 0.57–1)
DEPRECATED RDW RBC AUTO: 45 FL (ref 37–54)
ERYTHROCYTE [DISTWIDTH] IN BLOOD BY AUTOMATED COUNT: 13.8 % (ref 12.3–15.4)
GFR SERPL CREATININE-BSD FRML MDRD: 59 ML/MIN/1.73
GLOBULIN UR ELPH-MCNC: 3.2 GM/DL
GLUCOSE BLD-MCNC: 130 MG/DL (ref 65–99)
GLUCOSE BLDC GLUCOMTR-MCNC: 123 MG/DL (ref 70–130)
GLUCOSE BLDC GLUCOMTR-MCNC: 130 MG/DL (ref 70–130)
GLUCOSE BLDC GLUCOMTR-MCNC: 172 MG/DL (ref 70–130)
GLUCOSE BLDC GLUCOMTR-MCNC: 179 MG/DL (ref 70–130)
GLUCOSE BLDC GLUCOMTR-MCNC: 204 MG/DL (ref 70–130)
HBA1C MFR BLD: 5.9 % (ref 4.8–5.6)
HCT VFR BLD AUTO: 36.9 % (ref 34–46.6)
HDLC SERPL-MCNC: 30 MG/DL (ref 40–60)
HGB BLD-MCNC: 12 G/DL (ref 12–15.9)
LDLC SERPL CALC-MCNC: 56 MG/DL (ref 0–100)
LDLC/HDLC SERPL: 1.87 {RATIO}
LV EF 2D ECHO EST: 65 %
MAXIMAL PREDICTED HEART RATE: 144 BPM
MCH RBC QN AUTO: 29.3 PG (ref 26.6–33)
MCHC RBC AUTO-ENTMCNC: 32.5 G/DL (ref 31.5–35.7)
MCV RBC AUTO: 90 FL (ref 79–97)
PLATELET # BLD AUTO: 265 10*3/MM3 (ref 140–450)
PMV BLD AUTO: 10.2 FL (ref 6–12)
POTASSIUM BLD-SCNC: 4 MMOL/L (ref 3.5–5.2)
PROT SERPL-MCNC: 7.5 G/DL (ref 6–8.5)
RBC # BLD AUTO: 4.1 10*6/MM3 (ref 3.77–5.28)
RIGHT ARM BP: NORMAL MMHG
SODIUM BLD-SCNC: 142 MMOL/L (ref 136–145)
STRESS TARGET HR: 122 BPM
TRIGL SERPL-MCNC: 235 MG/DL (ref 0–150)
VLDLC SERPL-MCNC: 47 MG/DL
WBC NRBC COR # BLD: 8.32 10*3/MM3 (ref 3.4–10.8)

## 2019-06-05 PROCEDURE — 63710000001 INSULIN DETEMIR PER 5 UNITS: Performed by: INTERNAL MEDICINE

## 2019-06-05 PROCEDURE — 63710000001 INSULIN LISPRO (HUMAN) PER 5 UNITS: Performed by: INTERNAL MEDICINE

## 2019-06-05 PROCEDURE — 80053 COMPREHEN METABOLIC PANEL: CPT | Performed by: NURSE PRACTITIONER

## 2019-06-05 PROCEDURE — 70450 CT HEAD/BRAIN W/O DYE: CPT

## 2019-06-05 PROCEDURE — 99223 1ST HOSP IP/OBS HIGH 75: CPT | Performed by: PSYCHIATRY & NEUROLOGY

## 2019-06-05 PROCEDURE — 99232 SBSQ HOSP IP/OBS MODERATE 35: CPT | Performed by: INTERNAL MEDICINE

## 2019-06-05 PROCEDURE — 93306 TTE W/DOPPLER COMPLETE: CPT | Performed by: INTERNAL MEDICINE

## 2019-06-05 PROCEDURE — 97530 THERAPEUTIC ACTIVITIES: CPT

## 2019-06-05 PROCEDURE — 92523 SPEECH SOUND LANG COMPREHEN: CPT

## 2019-06-05 PROCEDURE — 82962 GLUCOSE BLOOD TEST: CPT

## 2019-06-05 PROCEDURE — 63710000001 INSULIN LISPRO (HUMAN) PER 5 UNITS: Performed by: NURSE PRACTITIONER

## 2019-06-05 PROCEDURE — 93306 TTE W/DOPPLER COMPLETE: CPT

## 2019-06-05 PROCEDURE — 80061 LIPID PANEL: CPT | Performed by: NURSE PRACTITIONER

## 2019-06-05 PROCEDURE — 97165 OT EVAL LOW COMPLEX 30 MIN: CPT

## 2019-06-05 PROCEDURE — 93880 EXTRACRANIAL BILAT STUDY: CPT | Performed by: INTERNAL MEDICINE

## 2019-06-05 PROCEDURE — 85027 COMPLETE CBC AUTOMATED: CPT | Performed by: NURSE PRACTITIONER

## 2019-06-05 PROCEDURE — 83036 HEMOGLOBIN GLYCOSYLATED A1C: CPT | Performed by: NURSE PRACTITIONER

## 2019-06-05 PROCEDURE — 97162 PT EVAL MOD COMPLEX 30 MIN: CPT

## 2019-06-05 RX ORDER — MONTELUKAST SODIUM 4 MG/1
1 TABLET, CHEWABLE ORAL 2 TIMES DAILY
COMMUNITY
End: 2019-06-07 | Stop reason: HOSPADM

## 2019-06-05 RX ORDER — HYOSCYAMINE SULFATE 0.125 MG
125 TABLET,DISINTEGRATING ORAL EVERY 4 HOURS PRN
COMMUNITY

## 2019-06-05 RX ORDER — OMEPRAZOLE AND SODIUM BICARBONATE 40; 1100 MG/1; MG/1
1 CAPSULE ORAL DAILY
COMMUNITY
End: 2019-11-07

## 2019-06-05 RX ORDER — METOPROLOL TARTRATE 50 MG/1
50 TABLET, FILM COATED ORAL EVERY 12 HOURS SCHEDULED
Status: DISCONTINUED | OUTPATIENT
Start: 2019-06-05 | End: 2019-06-07 | Stop reason: HOSPADM

## 2019-06-05 RX ADMIN — ATORVASTATIN CALCIUM 80 MG: 40 TABLET, FILM COATED ORAL at 20:25

## 2019-06-05 RX ADMIN — INSULIN LISPRO 2 UNITS: 100 INJECTION, SOLUTION INTRAVENOUS; SUBCUTANEOUS at 18:04

## 2019-06-05 RX ADMIN — INSULIN LISPRO 2 UNITS: 100 INJECTION, SOLUTION INTRAVENOUS; SUBCUTANEOUS at 21:48

## 2019-06-05 RX ADMIN — INSULIN DETEMIR 15 UNITS: 100 INJECTION, SOLUTION SUBCUTANEOUS at 20:26

## 2019-06-05 RX ADMIN — SODIUM CHLORIDE, PRESERVATIVE FREE 3 ML: 5 INJECTION INTRAVENOUS at 09:00

## 2019-06-05 RX ADMIN — TIMOLOL MALEATE 1 DROP: 5 SOLUTION/ DROPS OPHTHALMIC at 20:26

## 2019-06-05 RX ADMIN — METOPROLOL TARTRATE 50 MG: 50 TABLET ORAL at 14:28

## 2019-06-05 RX ADMIN — INSULIN LISPRO 3 UNITS: 100 INJECTION, SOLUTION INTRAVENOUS; SUBCUTANEOUS at 08:59

## 2019-06-05 RX ADMIN — INSULIN LISPRO 5 UNITS: 100 INJECTION, SOLUTION INTRAVENOUS; SUBCUTANEOUS at 18:05

## 2019-06-05 RX ADMIN — ASPIRIN 325 MG ORAL TABLET 325 MG: 325 PILL ORAL at 19:26

## 2019-06-05 RX ADMIN — SERTRALINE HYDROCHLORIDE 50 MG: 50 TABLET ORAL at 08:44

## 2019-06-05 RX ADMIN — SODIUM CHLORIDE, PRESERVATIVE FREE 3 ML: 5 INJECTION INTRAVENOUS at 20:26

## 2019-06-05 NOTE — THERAPY EVALUATION
Acute Care - Occupational Therapy Initial Evaluation  [facility]     Patient Name: Isabel Neil  : 1945  MRN: [id number]  Today's Date: 2019  Onset of Illness/Injury or Date of Surgery: 19  Date of Referral to OT: 19  Referring Physician: Maria Elena MORTENSEN    Admit Date: 2019       ICD-10-CM ICD-9-CM   1. Acute CVA (cerebrovascular accident) (CMS/HCC) I63.9 434.91   2. Impaired functional mobility, balance, gait, and endurance Z74.09 V49.89   3. Impaired mobility and ADLs Z74.09 799.89     Patient Active Problem List   Diagnosis   • AIS   • Type 2 diabetes mellitus with complication, without long-term current use of insulin (CMS/HCC)   • Ulcerative colitis with complication (CMS/HCC)   • Acute CVA (cerebrovascular accident) (CMS/HCC)     Past Medical History:   Diagnosis Date   • Anxiety    • Blindness of right eye     Post trauma   • Colon polyp    • COPD (chronic obstructive pulmonary disease) (CMS/HCC)    • CVA (cerebral vascular accident) (CMS/HCC) 2019   • Dementia    • Depression    • Diabetes mellitus (CMS/HCC)    • Dyslipidemia    • History of irregular heartbeat    • Ulcerative colitis (CMS/HCC)      Past Surgical History:   Procedure Laterality Date   • COLONOSCOPY W/ POLYPECTOMY     • HYSTERECTOMY            OT ASSESSMENT FLOWSHEET (last 12 hours)      Occupational Therapy Evaluation     Row Name 19 0758                   OT Evaluation Time/Intention    Subjective Information  no complaints  -LS (r) CH (t) LS (c)        Document Type  evaluation  -LS (r) CH (t) LS (c)        Mode of Treatment  occupational therapy  -LS (r) CH (t) LS (c)        Patient Effort  good  -LS (r) CH (t) LS (c)        Symptoms Noted During/After Treatment  none  -LS (r) CH (t) LS (c)           General Information    Patient Profile Reviewed?  yes  -LS (r) CH (t) LS (c)        Onset of Illness/Injury or Date of Surgery  19  -LS (r) CH (t) LS (c)        Referring Physician  Maria Elena  APRN.   -LS (r) CH (t) LS (c)        Patient Observations  alert;cooperative;agree to therapy  -LS (r) CH (t) LS (c)        Patient/Family Observations  Family present.  -LS (r) CH (t) LS (c)        Prior Level of Function  independent:;all household mobility;feeding;grooming;dressing;bathing;cooking;cleaning;driving  -LS (r) CH (t) LS (c)        Equipment Currently Used at Home  none  -LS (r) CH (t) LS (c)        Pertinent History of Current Functional Problem  Pt presents to ED with R sided weakness, slurred speech and difficulty walking. Pt s/p TPA. Imaging (-) for acute infarct, imaging (+) for moderate atrophy/ischemic changes.   -LS (r) CH (t) LS (c)        Existing Precautions/Restrictions  fall  -LS (r) CH (t) LS (c)        Risks Reviewed  patient:;family:;LOB;nausea/vomiting;dizziness;increased discomfort;change in vital signs;lines disloged  -LS (r) CH (t) LS (c)        Benefits Reviewed  patient:;family:;improve function;increase independence;increase strength;increase balance;decrease pain;decrease risk of DVT;increase knowledge  -LS (r) CH (t) LS (c)        Barriers to Rehab  none identified  -LS (r) CH (t) LS (c)           Relationship/Environment    Lives With  child(quita), adult  -LS (r) CH (t) LS (c)        Family Caregiver if Needed  child(quita), adult  -LS (r) CH (t) LS (c)           Resource/Environmental Concerns    Current Living Arrangements  home/apartment/condo  -LS (r) CH (t) LS (c)        Resource/Environmental Concerns  home accessibility  -LS (r) CH (t) LS (c)        Home Accessibility Concerns  stairs to enter home;bathroom not accessible  -LS (r) CH (t) LS (c)        Transportation Concerns  car, none  -LS (r) CH (t) LS (c)           Home Main Entrance    Number of Stairs, Main Entrance  ten There is a seperate ground level entrance with a steep hill.  -LS (r) CH (t) LS (c)        Stair Railings, Main Entrance  railing on right side (ascending)  -LS (r) CH (t) LS (c)           Cognitive  Assessment/Interventions    Additional Documentation  Cognitive Assessment/Intervention (Group)  -LS (r) CH (t) LS (c)           Cognitive Assessment/Intervention- PT/OT    Affect/Mental Status (Cognitive)  WFL  -LS (r) CH (t) LS (c)        Orientation Status (Cognition)  oriented x 4  -LS (r) CH (t) LS (c)        Follows Commands (Cognition)  follows one step commands;over 90% accuracy  -LS (r) CH (t) LS (c)        Cognitive Function (Cognitive)  safety deficit  -LS (r) CH (t) LS (c)        Safety Deficit (Cognitive)  mild deficit;awareness of need for assistance;safety precautions awareness  -LS (r) CH (t) LS (c)        Personal Safety Interventions  fall prevention program maintained;gait belt;nonskid shoes/slippers when out of bed  -LS (r) CH (t) LS (c)           Safety Issues, Functional Mobility    Safety Issues Affecting Function (Mobility)  awareness of need for assistance;safety precaution awareness  -LS (r) CH (t) LS (c)        Impairments Affecting Function (Mobility)  balance;endurance/activity tolerance;strength  -LS (r) CH (t) LS (c)           Bed Mobility Assessment/Treatment    Bed Mobility Assessment/Treatment  scooting/bridging;supine-sit  -LS (r) CH (t) LS (c)        Scooting/Bridging [place] (Bed Mobility)  contact guard;verbal cues  -LS (r) CH (t) LS (c)        Supine-Sit [place] (Bed Mobility)  contact guard;verbal cues  -LS (r) CH (t) LS (c)        Assistive Device (Bed Mobility)  bed rails;draw sheet;head of bed elevated  -LS (r) CH (t) LS (c)        Comment (Bed Mobility)  Pt required extra time to complete.   -LS (r) CH (t) LS (c)           Functional Mobility    Functional Mobility- Comment  T/f to PT EOB.   -LS (r) CH (t) LS (c)           Transfer Assessment/Treatment    Comment (Transfers)  T/f to PT EOB.   -LS (r) CH (t) LS (c)           BADL Safety/Performance    Impairments, BADL Safety/Performance  balance;endurance/activity tolerance;strength  -LS (r) CH (t) LS (c)            General ROM    GENERAL ROM COMMENTS  BUE WFL  -LS (r) CH (t) LS (c)           MMT (Manual Muscle Testing)    General MMT Comments  R shoulder/elbow flex 3+/5. Remaining BUE 4/5   -LS (r) CH (t) LS (c)           Motor Assessment/Interventions    Additional Documentation  Balance (Group);Fine Motor Testing & Training (Group);Gross Motor Coordination (Group)  -LS (r) CH (t) LS (c)           Gross Motor Coordination    Gross Motor Impairments  finger to nose  -LS (r) CH (t) LS (c)        Gross Motor Skill, Impairments Detail  BUE intact  -LS (r) CH (t) LS (c)           Balance    Balance  static sitting balance  -LS (r) CH (t) LS (c)           Static Sitting Balance    Level of [place] (Unsupported Sitting, Static Balance)  contact guard assist  -LS (r) CH (t) LS (c)        Sitting Position (Unsupported Sitting, Static Balance)  sitting on edge of bed  -LS (r) CH (t) LS (c)        Time Able to Maintain Position (Unsupported Sitting, Static Balance)  2 to 3 minutes  -LS (r) CH (t) LS (c)        Comment (Unsupported Sitting, Static Balance)  Pt with mild R lateral lean self corrected.   -LS (r) CH (t) LS (c)           Fine Motor Testing & Training    Fine Motor Tests  other (see comments) Thumb to small finger opposition.   -LS (r) CH (t) LS (c)        Comment, Fine Motor Coordination  Intact.  -LS (r) CH (t) LS (c)           Sensory Assessment/Intervention    Sensory General Assessment  no sensation deficits identified  -LS (r) CH (t) LS (c)        Additional Documentation  Vision Assessment/Intervention (Group)  -LS (r) CH (t) LS (c)           Vision Assessment/Intervention    Visual Impairment/Limitations  WFL;other (see comments) Pt blind in R eye from previous trauma.   -LS (r) CH (t) LS (c)           Positioning and Restraints    Pre-Treatment Position  in bed  -LS (r) CH (t) LS (c)        Post Treatment Position  other  -LS (r) CH (t) LS (c)        Other Position  with other staff Pt t/f to PT EOB.  -LS (r)  CH (t) LS (c)           Pain Assessment    Additional Documentation  Pain Scale: Numbers Pre/Post-Treatment (Group)  -LS (r) CH (t) LS (c)           Pain Scale: Numbers Pre/Post-Treatment    Pain Scale: Numbers, Pretreatment  0/10 - no pain  -LS (r) CH (t) LS (c)        Pain Scale: Numbers, Post-Treatment  0/10 - no pain  -LS (r) CH (t) LS (c)           Clinical Impression (OT)    Date of Referral to OT  06/04/19  -LS (r) CH (t) LS (c)        OT Diagnosis  Decreased independence with ADLs  -LS (r) CH (t) LS (c)        Patient/Family Goals Statement (OT Eval)  Return to PLOF  -LS (r) CH (t) LS (c)        Criteria for Skilled Therapeutic Interventions Met (OT Eval)  yes;treatment indicated  -LS (r) CH (t) LS (c)        Rehab Potential (OT Eval)  good, to achieve stated therapy goals  -LS (r) CH (t) LS (c)        Care Plan Review, Other Participant (OT Eval)  daughter  -LS (r) CH (t) LS (c)        Anticipated Discharge Disposition (OT)  inpatient rehabilitation facility  -LS (r) CH (t) LS (c)           Vital Signs    Pre Systolic BP Rehab  148  -LS (r) CH (t) LS (c)        Pre Treatment Diastolic BP  71  -LS (r) CH (t) LS (c)        Post Systolic BP Rehab  -- Pt t/f to PT EOB  -LS (r) CH (t) LS (c)        Pretreatment Heart Rate (beats/min)  107  -LS (r) CH (t) LS (c)        Pre SpO2 (%)  94  -LS (r) CH (t) LS (c)        O2 Delivery Pre Treatment  room air  -LS (r) CH (t) LS (c)        Pre Patient Position  Supine  -LS (r) CH (t) LS (c)        Intra Patient Position  Sitting  -LS (r) CH (t) LS (c)        Post Patient Position  Sitting  -LS (r) CH (t) LS (c)           OT Goals    Transfer Goal Selection (OT)  transfer, OT goal 1  -LS (r) CH (t) LS (c)        Dressing Goal Selection (OT)  dressing, OT goal 1  -LS (r) CH (t) LS (c)        Toileting Goal Selection (OT)  toileting, OT goal 1  -LS (r) CH (t) LS (c)           Transfer Goal 1 (OT)    Activity/Assistive Device (Transfer Goal 1, OT)   sit-to-stand/stand-to-sit;toilet  -LS (r) CH (t) LS (c)        [place] Level/Cues Needed (Transfer Goal 1, OT)  contact guard assist;verbal cues required  -LS (r) CH (t) LS (c)        Time Frame (Transfer Goal 1, OT)  long term goal (LTG);by discharge  -LS (r) CH (t) LS (c)        Progress/Outcome (Transfer Goal 1, OT)  goal ongoing  -LS (r) CH (t) LS (c)           Dressing Goal 1 (OT)    Activity/Assistive Device (Dressing Goal 1, OT)  lower body dressing Don/Doff socks with AAD  -LS (r) CH (t) LS (c)        [place]/Cues Needed (Dressing Goal 1, OT)  minimum assist (75% or more patient effort);verbal cues required  -LS (r) CH (t) LS (c)        Time Frame (Dressing Goal 1, OT)  long term goal (LTG);by discharge  -LS (r) CH (t) LS (c)        Progress/Outcome (Dressing Goal 1, OT)  goal ongoing  -LS (r) CH (t) LS (c)           Toileting Goal 1 (OT)    Activity/Device (Toileting Goal 1, OT)  adjust/manage clothing;perform perineal hygiene;raised toilet seat;grab bar/safety frame  -LS (r) CH (t) LS (c)        [place] Level/Cues Needed (Toileting Goal 1, OT)  minimum assist (75% or more patient effort);verbal cues required  -LS (r) CH (t) LS (c)        Time Frame (Toileting Goal 1, OT)  long term goal (LTG);by discharge  -LS (r) CH (t) LS (c)        Progress/Outcome (Toileting Goal 1, OT)  goal ongoing  -LS (r) CH (t) LS (c)           Living Environment    Home Accessibility  stairs to enter home;tub/shower is not walk in  -LS (r) CH (t) LS (c)          User Key  (r) = Recorded By, (t) = Taken By, (c) = Cosigned By    Initials Name Effective Dates    Anita Castro, PT 06/19/15 -     Ravi Valladares, OT Student 03/13/19 -          Occupational Therapy Education     Title: PT OT SLP Therapies (In Progress)     Topic: Occupational Therapy (In Progress)     Point: Precautions (Done)     Description: Instruct learner(s) on prescribed precautions during self-care and functional transfers.     Learning Progress Summary           Patient Acceptance, E, VU by  at 6/5/2019  7:58 AM    Comment:  PT educated on the benefits of therapy and the role of OT.   Family Acceptance, E, VU by  at 6/5/2019  7:58 AM    Comment:  PT educated on the benefits of therapy and the role of OT.                   Point: Body mechanics (Done)     Description: Instruct learner(s) on proper positioning and spine alignment during self-care, functional mobility activities and/or exercises.    Learning Progress Summary           Patient Acceptance, E, VU by  at 6/5/2019  7:58 AM    Comment:  PT educated on the benefits of therapy and the role of OT.   Family Acceptance, E, VU by  at 6/5/2019  7:58 AM    Comment:  PT educated on the benefits of therapy and the role of OT.                               User Key     Initials Effective Dates Name Provider Type Discipline     03/13/19 -  Ravi Nickerson OT Student OT Student OT                  OT Recommendation and Plan  Outcome Summary/Treatment Plan (OT)  Anticipated Discharge Disposition (OT): inpatient rehabilitation facility  Plan of Care Review  Plan of Care Reviewed With: daughter, patient  Plan of Care Reviewed With: daughter, patient  Outcome Summary: OT completed a brief chart review. CGA for bed mobility requiring extra time to complete. Pt exhibited mild R lateral lean in sitting. Recommend CONT skilled IPOT POC. Recommend D/C to inpatient rehabilitation.     Outcome Measures     Row Name 06/05/19 0820 06/05/19 0758          How much help from another person do you currently need...    Turning from your back to your side while in flat bed without using bedrails?  3  -LS (r) NW (t) LS (c)  --     Moving from lying on back to sitting on the side of a flat bed without bedrails?  3  -LS (r) NW (t) LS (c)  --     Moving to and from a bed to a chair (including a wheelchair)?  3  -LS (r) NW (t) LS (c)  --     Standing up from a chair using your arms (e.g.,  wheelchair, bedside chair)?  3  -LS (r) NW (t) LS (c)  --     Climbing 3-5 steps with a railing?  3  -LS (r) NW (t) LS (c)  --     To walk in hospital room?  3  -LS (r) NW (t) LS (c)  --     AM-PAC 6 Clicks Score  18  -LS (r) NW (t)  --        How much help from another is currently needed...    Putting on and taking off regular lower body clothing?  --  2  -LS (r) CH (t) LS (c)     Bathing (including washing, rinsing, and drying)  --  2  -LS (r) CH (t) LS (c)     Toileting (which includes using toilet bed pan or urinal)  --  2  -LS (r) CH (t) LS (c)     Putting on and taking off regular upper body clothing  --  3  -LS (r) CH (t) LS (c)     Taking care of personal grooming (such as brushing teeth)  --  3  -LS (r) CH (t) LS (c)     Eating meals  --  3  -LS (r) CH (t) LS (c)     Score  --  15  -LS (r) CH (t)        Modified [place] Scale    Pre-Stroke Modified [place] Scale  0 - No Symptoms at all.  -LS (r) NW (t) LS (c)  0 - No Symptoms at all.  -LS (r) CH (t) LS (c)     Modified [place] Scale  3 - Moderate disability.  Requiring some help, but able to walk without assistance.  -LS (r) NW (t) LS (c)  3 - Moderate disability.  Requiring some help, but able to walk without assistance.  -LS (r) CH (t) LS (c)        Functional Assessment    Outcome Measure Options  AM-PAC 6 Clicks Basic Mobility (PT)  -LS (r) NW (t) LS (c)  AM-PAC 6 Clicks Daily Activity (OT);Modified [place]  -LS (r) CH (t) LS (c)       User Key  (r) = Recorded By, (t) = Taken By, (c) = Cosigned By    Initials Name Provider Type    Anita Castro, PT Physical Therapist    NW Roland Vasquez, PT Student PT Student    CH Ravi Nickerson, OT Student OT Student          Time Calculation:   Time Calculation- OT     Row Name 06/05/19 0758             Time Calculation- OT    OT Start Time  0758  -LS (r) CH (t) LS (c)      OT Received On  06/05/19  -LS (r) CH (t) LS (c)      OT Goal Re-Cert Due Date  06/15/19  -LS (r) CH (t) LS (c)        User Key  (r) =  Recorded By, (t) = Taken By, (c) = Cosigned By    Initials Name Provider Type    LS Anita Chase, PT Physical Therapist    CH Ravi Nickerson, OT Student OT Student        Therapy Charges for Today     Code Description Service Date Service Provider Modifiers Qty    68232298697 HC OT EVAL LOW COMPLEXITY 4 6/5/2019 Ravi Nickerson, OT Student GO 1               MADISON Ovalle  6/5/2019

## 2019-06-05 NOTE — CONSULTS
"    Referring Provider: Dr. Wooten  Reason for Consultation: Stroke    Patient Care Team:  Dominick Soares MD as PCP - General (Medical Oncology)    Chief complaint Right-sided weakness    Subjective .     History of present illness: 74-yo RH woman with PMH notable for DM 2, ulcerative colitis, COPD and history of irregular heartbeat (patient unable to describe, not followed by cardiology) presented to the ED yesterday after acute onset of right-sided weakness and difficulty speaking while standing at the sink washing dishes at 1615.  She noted she was unable to lift her right foot off the ground, and arm was also weak.  Her speech was reportedly slurred but she denies difficulty thinking of words or understanding others although she reports she was having trouble answering because \"they were asking too many questions.\"  In the ED she was noted to have weakness of right upper extremity and could not raise right lower extremity off the bed and TPA was initiated.  Today notes weakness is improved, was able to walk with PT with walker and support.  Denies headache or any pain at all.  Denies previous similar symptoms such as weakness or numbness.  Denies recent illness other than congestion and postnasal drip.  Reports no illness otherwise including fever, cough or shortness of breath, nausea or vomiting.  No recent palpitations or chest pain.  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 Has chronic arthralgias, has had diarrhea a/w UC but no abdominal pain.     Review of Systems  Pertinent items are noted in HPI, all other systems reviewed and negative     History  Past Medical History:   Diagnosis Date   • Anxiety    • Blindness of right eye     Post trauma   • Colon polyp    • COPD (chronic obstructive pulmonary disease) (CMS/[facility])    • CVA (cerebral vascular accident) (CMS/[facility]) 6/4/2019   • Dementia    • Depression    • Diabetes mellitus (CMS/[facility])    • Dyslipidemia    • History of irregular " heartbeat    • Ulcerative colitis (CMS/HCC)    ,   Past Surgical History:   Procedure Laterality Date   • COLONOSCOPY W/ POLYPECTOMY  2018   • HYSTERECTOMY     ,   Family History   Problem Relation Age of Onset   • Alzheimer's disease Mother    • Diabetes Mother    • Breast cancer Sister    • Heart attack Brother    • Alzheimer's disease Maternal Grandmother    • Coronary artery disease Sister    • Diabetes Sister    • Valvular heart disease Sister    • Lung disease Brother    • Melanoma Brother    • No Known Problems Brother    ,   Social History     Tobacco Use   • Smoking status: Former Smoker     Packs/day: 1.00     Years: 50.00     Pack years: 50.00     Types: Cigarettes     Last attempt to quit: 6/4/2009     Years since quitting: 10.0   • Smokeless tobacco: Never Used   Substance Use Topics   • Alcohol use: No     Frequency: Never   • Drug use: Not on file   ,   Medications Prior to Admission   Medication Sig Dispense Refill Last Dose   • albuterol sulfate  (90 Base) MCG/ACT inhaler Inhale 2 puffs Every 4 (Four) Hours As Needed for Wheezing.      • calcium carbonate (OS-VU) 600 MG tablet Take 1,200 mg by mouth Daily.      • clidinium-chlordiazePOXIDE (LIBRAX) 5-2.5 MG per capsule Take 1 capsule by mouth 2 (Two) Times a Day.      • diazePAM (VALIUM) 5 MG tablet Take 5 mg by mouth At Night As Needed for Anxiety.      • gemfibrozil (LOPID) 600 MG tablet Take 600 mg by mouth 2 (Two) Times a Day Before Meals.      • hyoscyamine (LEVBID) 0.375 MG 12 hr tablet Take 0.375 mg by mouth Daily.      • memantine (NAMENDA) 10 MG tablet Take 10 mg by mouth 2 (Two) Times a Day.      • metFORMIN (GLUCOPHAGE) 1000 MG tablet Take 1,000 mg by mouth 2 (Two) Times a Day With Meals.      • metoprolol tartrate (LOPRESSOR) 50 MG tablet Take 50 mg by mouth 2 (Two) Times a Day.      • nystatin (MYCOSTATIN) 525530 UNIT/ML suspension Swish and swallow 500,000 Units As Needed.      • sertraline (ZOLOFT) 50 MG tablet Take 50 mg by  "mouth Daily.      • timolol (TIMOPTIC) 0.5 % ophthalmic solution Administer 1 drop to both eyes Daily.      , Scheduled Meds:    aspirin 325 mg Oral Daily   Or      aspirin 300 mg Rectal Daily   atorvastatin 80 mg Oral Nightly   insulin lispro 0-7 Units Subcutaneous 4x Daily With Meals & Nightly   sertraline 50 mg Oral Daily   sodium chloride 3 mL Intravenous Q12H   sodium chloride 100 mL/hr Intravenous Once   timolol 1 drop Both Eyes Daily   , Continuous Infusions:   , PRN Meds:  •  acetaminophen **OR** [DISCONTINUED] acetaminophen  •  bisacodyl  •  ondansetron  •  sodium chloride  •  sodium chloride and Allergies:  Sulfa antibiotics    Objective     Vital Signs   Blood pressure 131/85, pulse 104, temperature 98.2 °F (36.8 °C), temperature source Oral, resp. rate 18, height 165.1 cm (65\"), weight 56.6 kg (124 lb 12.5 oz), SpO2 95 %, not currently breastfeeding.    Physical Exam: Thin elderly white woman in no acute distress except somewhat anxious, very quiet, but fully oriented.  Mildly inattentive but memory, language and fund of knowledge intact.  No dysarthria.  Pupils 3 mm and reactive, visual fields full OS, blind OD, right strabismus, but full range of EOMs OS.  Hearing intact to voice, normal facial sensation and movement, palate and shoulders elevate symmetrically and tongue protrudes midline  Motor: No weakness detected in upper extremities and no pronator drift.  Probable mild proximal weakness right lower extremity and uses arms to pull right thigh to perform heel-knee-shin.  However at least 4/5 with hip flexion and no weakness detected on knee flexion with encouragement.  Muscle tone normal  Coordination commensurate with strength as above  Reflexes hypoactive, no response to plantar stim  Light touch symmetric throughout  Neck supple, no carotid bruit appreciated  Heart RRR possible slight systolic murmur  Lungs clear anteriorly, normal respiratory effort  Chest wall expands symmetrically  Abdomen " soft, NT, ND with positive bowel sounds  Psych: Anxious, normal thought content  Head normocephalic and atraumatic      Results Review:   I reviewed the patient's new clinical results.  I reviewed the patient's new imaging results and agree with the interpretation.  I reviewed the patient's other test results and agree with the interpretation  Labs reviewed, A1c 5.9  Total cholesterol 133 triglycerides 235, HDL 30, LDL 56  CBC normal  CMP normal except glucose 130  Head CT, CT perfusion and brain MRI images all reviewed, agree unremarkable other than moderate atrophy and deep white matter chronic ischemic gliotic changes on MRI.  Brain MRI performed at 2144.    Assessment/Plan       AIS    Type 2 diabetes mellitus with complication, without long-term current use of insulin (CMS/HCC)    Ulcerative colitis with complication (CMS/HCC)    Acute CVA (cerebrovascular accident) (CMS/HCC)      Apparent stroke/TIA, questionably brainstem versus MARY territory but not currently visible on scan.  Primary risk factors include age and diabetes, although the latter well-controlled.  Await echo and carotids.  Symptoms had cleared significantly by yesterday evening when MRI was performed but patient still exhibits difficulty on exam, with some nonphysiologic elements.  Continue post TPA order set.  Discussed testing and treatment with patient and family.    I discussed the patients findings and my recommendations with patient and family    Gaby Calvert MD  06/05/19  9:14 AM
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
## 2019-06-05 NOTE — THERAPY EVALUATION
Acute Care - Speech Language Pathology Initial Evaluation  [facility]   Cognitive-Communication Evaluation       Patient Name: Isabel Neil  : 1945  MRN: [id number]  Today's Date: 2019  Onset of Illness/Injury or Date of Surgery: 19     Referring Physician: Maria Elena MROTENSEN      Admit Date: 2019     Visit Dx:    ICD-10-CM ICD-9-CM   1. Acute CVA (cerebrovascular accident) (CMS/HCC) I63.9 434.91   2. Impaired functional mobility, balance, gait, and endurance Z74.09 V49.89   3. Impaired mobility and ADLs Z74.09 799.89     Patient Active Problem List   Diagnosis   • AIS   • Type 2 diabetes mellitus with complication, without long-term current use of insulin (CMS/HCC)   • Ulcerative colitis with complication (CMS/HCC)   • Acute CVA (cerebrovascular accident) (CMS/HCC)     Past Medical History:   Diagnosis Date   • Anxiety    • Blindness of right eye     Post trauma   • Colon polyp    • COPD (chronic obstructive pulmonary disease) (CMS/HCC)    • CVA (cerebral vascular accident) (CMS/HCC) 2019   • Dementia    • Depression    • Diabetes mellitus (CMS/HCC)    • Dyslipidemia    • History of irregular heartbeat    • Ulcerative colitis (CMS/HCC)      Past Surgical History:   Procedure Laterality Date   • COLONOSCOPY W/ POLYPECTOMY     • HYSTERECTOMY          SLP EVALUATION (last 72 hours)      SLP SLC Evaluation     Row Name 19 1055                   Communication Assessment/Intervention    Document Type  evaluation  -CH        Subjective Information  no complaints  -CH        Patient Observations  alert;cooperative;agree to therapy  -CH        Patient/Family Observations  family present  -CH        Patient Effort  excellent  -CH           General Information    Patient Profile Reviewed  yes  -CH           Pain Scale: Numbers Pre/Post-Treatment    Pain Scale: Numbers, Pretreatment  0/10 - no pain  -        Pain Scale: Numbers, Post-Treatment  0/10 - no pain  -           Oral Musculature  and Cranial Nerve Assessment    Oral Motor General Assessment  generalized oral motor weakness  -        Lingual Impairment, Detail. Cranial Nerves IX, XII (Glossopharyngeal and Hypoglossal)  reduced strength  -           Motor Speech Assessment/Intervention    Motor Speech Function  mild impairment  -        Characteristics Consistent with Dysarthria  slow rate  -        Initiation of Phonation (Communication)  WFL  -           Cursory Voice Assessment/Intervention    Quality and Resonance (Voice)  WNL  -           Cognitive Assessment Intervention- SLP    Cognitive Function (Cognition)  mild impairment  -        Orientation Status (Cognition)  awareness of basic personal information;person;place;situation;WFL  -        Memory (Cognitive)  simple;functional;delayed;mild impairment  -CH        Attention (Cognitive)  selective;sustained;WFL;attention to detail;mild impairment;quiet environment  -        Thought Organization (Cognitive)  verbal sequencing;WFL  -        Reasoning (Cognitive)  simple;mod-complex;WFL;deductive;mild impairment  -CH        Problem Solving (Cognitive)  simple;L  -        Functional Math (Cognitive)  word problems;L  -           SLP Clinical Impressions    SLP Diagnosis  MIld cognitive linguistic impairment as evidenced by word finding difficulties and recall. Mild dysarthric speech with slow rate noted.  -        Rehab Potential/Prognosis  excellent  -        SLC Criteria for Skilled Therapy Interventions Met  yes  -        Functional Impact  functional impact in social situations;functional impact in ADLs;difficulty in expressing complex messages  -           Recommendations    Therapy Frequency (SLP SLC)  5 days per week  -        Predicted Duration Therapy Intervention (Days)  until discharge  -        Anticipated Dischage Disposition  unknown;anticipate therapy at next level of care  -          User Key  (r) = Recorded By, (t) = Taken By, (c) =  Cosigned By    Initials Name Effective Dates    SHELLEY Milagros Chu MS CCC-SLP 02/14/19 -              EDUCATION  The patient has been educated in the following areas:     Cognitive Impairment Communication Impairment.    SLP Recommendation and Plan  SLP Diagnosis: MIld cognitive linguistic impairment as evidenced by word finding difficulties and recall. Mild dysarthric speech with slow rate noted.     SLC Criteria for Skilled Therapy Interventions Met: yes  SLP Diagnosis: MIld cognitive linguistic impairment as evidenced by word finding difficulties and recall. Mild dysarthric speech with slow rate noted.  Anticipated Dischage Disposition: unknown, anticipate therapy at next level of care     Predicted Duration Therapy Intervention (Days): until discharge           SLP GOALS     Row Name 06/05/19 1055             Word Retrieval Skills Goal 1 (SLP)    Improve Word Retrieval Skills By Goal 1 (SLP)  completing functional word finding tasks;80%;independently (over 90% accuracy)  -      Time Frame (Word Retrieval Goal 1, SLP)  by discharge  -         Connected Speech to Express Thoughts Goal 1 (SLP)    Improve Narrative Discourse to Express Thoughts By Goal 1 (SLP)  describing a picture;80%;independently (over 90% accuracy)  -      Time Frame (Connected Speech Goal 1, SLP)  by discharge  -         Additional Goal 1 (SLP)    Additional Goal 1, SLP  LTG: Patient will improve speech, cognition and language for improved participation with her own care and rehabilitation with occ cues  -      Time Frame (Additional Goal 1, SLP)  by discharge  -        User Key  (r) = Recorded By, (t) = Taken By, (c) = Cosigned By    Initials Name Provider Type    Milagros Koenig MS CCC-SLP Speech and Language Pathologist                  Time Calculation:     Time Calculation- SLP     Row Name 06/05/19 1324             Time Calculation- SLP    SLP Start Time  1055  -      SLP Received On  06/05/19  -        User Key   (r) = Recorded By, (t) = Taken By, (c) = Cosigned By    Initials Name Provider Type     Milagros Chu MS CCC-SLP Speech and Language Pathologist          Therapy Charges for Today     Code Description Service Date Service Provider Modifiers Qty    [id number] [facility] ALLIE SPEECH AND PROD W LANG  5 6/5/2019 Milagros Chu MS CCC-SLP GN 1                     Milagros Chu MS CCC-SLP  6/5/2019

## 2019-06-05 NOTE — PLAN OF CARE
Problem: Stroke (Ischemic) (Adult)  Goal: Signs and Symptoms of Listed Potential Problems Will be Absent, Minimized or Managed (Stroke)  Outcome: Ongoing (interventions implemented as appropriate)   06/05/19 0820   Goal/Outcome Evaluation   Problems Assessed (Stroke (Ischemic)) motor/sensory impairment   Problems Assessed (Stroke (Ischemic)) motor/sensory impairment

## 2019-06-05 NOTE — PROGRESS NOTES
INTENSIVIST   PROGRESS NOTE     Hospital:  LOS: 1 day      S     Ms. Isabel Neil, 74 y.o. female is followed for:    AIS    Type 2 diabetes mellitus with complication, without long-term current use of insulin (CMS/[facility])    Ulcerative colitis with complication (CMS/[facility])    Acute CVA (cerebrovascular accident) (CMS/[facility])    As an Intensivist, we provide an integrated approach to the ICU patient and family, medical management of comorbid conditions, lead interdisciplinary rounds and coordinate the care with all other services, including those from other specialists.     Interval History:  Much better.  No neuro deficit.  Walked with PT.     The patient's relevant past medical, surgical and social history were reviewed and updated in Epic as appropriate.     ROS:   Constitutional: Negative for fever.   Respiratory: Negative for dyspnea.   Cardiovascular: Negative for chest pain.   Gastrointestinal: Negative for  nausea, vomiting and diarrhea.        O     Vitals:  Temp: 98.2 °F (36.8 °C) (06/05/19 1200) Temp  Min: 98.2 °F (36.8 °C)  Max: 98.8 °F (37.1 °C)   BP: (!) 135/109 (06/05/19 1700) BP  Min: 69/50  Max: 169/73   Pulse: 84 (06/05/19 1700) Pulse  Min: 76  Max: 129   Resp: 16 (06/05/19 1700) Resp  Min: 14  Max: 20   SpO2: 95 % (06/05/19 1700) SpO2  Min: 92 %  Max: 96 %   Device: room air (06/05/19 1700)    Flow Rate:   No Data Recorded     Physical Examination  Telemetry:  Rhythm: sinus tachycardia (06/05/19 1400)         Constitutional:  No acute distress.  Thin.   Cardiovascular: Normal rate, regular and rhythm. Normal heart sounds.  No murmurs, gallop or rub.   Respiratory: No respiratory distress.  Normal breath sounds  No adventitious sounds    Abdominal:  Soft with no tenderness  No distension. No HSM.   Extremities: Warm with no cyanosis.  No peripheral edema.   Neurological:   Alert and Oriented to person, place, and time.  Best Eye Response: 4-->(E4) spontaneous (06/05/19 1400)  Best Motor Response:  6-->(M6) obeys commands (06/05/19 1400)  Best Verbal Response: 5-->(V5) oriented (06/05/19 1400)  [place] Coma Scale Score: 15 (06/05/19 1400)   Lines/Drains/Airways: Peripheral IV(s)     Total (NIH Stroke Scale): 2 (06/05/19 0700)    Hematology:  Results from last 7 days   Lab Units 06/05/19 0419 06/04/19 1739 06/04/19 1736   WBC 10*3/mm3 8.32 8.23  --    HEMOGLOBIN g/dL 12.0 13.0  --    HEMOGLOBIN, POC g/dL  --   --  13.3   MCV fL 90.0 89.1  --    PLATELETS 10*3/mm3 265 300  --        Chemistry:  Estimated Creatinine Clearance: 46.2 mL/min (by C-G formula based on SCr of 0.92 mg/dL).    Results from last 7 days   Lab Units 06/05/19 0419   SODIUM mmol/L 142   POTASSIUM mmol/L 4.0   CHLORIDE mmol/L 104   CO2 mmol/L 25.0   ANION GAP mmol/L 13.0   GLUCOSE mg/dL 130*   CALCIUM mg/dL 9.6     Results from last 7 days   Lab Units 06/05/19 0419 06/04/19 1736   BUN mg/dL 18  --    CREATININE mg/dL 0.92 1.30           Hepatic:  Results from last 7 days   Lab Units 06/05/19 0419   ALK PHOS U/L 104   BILIRUBIN mg/dL 0.4   ALT (SGPT) U/L 5   AST (SGOT) U/L 16   ALBUMIN g/dL 4.30       Images:  Mri Brain Without Contrast    Result Date: 6/4/2019  Moderate atrophy with moderate chronic ischemic changes around the ventricles. No evidence of recent infarct. Signer Name: Nawaf Malin MD  Signed: 6/4/2019 9:50 PM  Workstation Name: [id number]     Xr Chest 1 View    Result Date: 6/5/2019  Chronic change; no acute disease.  D:  06/04/2019 E:  06/05/2019  This report was finalized on 6/5/2019 9:21 AM by Dr. Geoffrey Sosa MD.      Ct Head Without Contrast Stroke Protocol    Result Date: 6/4/2019  Age-appropriate atrophy and fairly extensive but chronic appearing central white matter changes. No evidence of intracranial hemorrhage, no well-defined infarct, or other clearly acute renal disease.    Note: Exam time is shown as 1727; study was reviewed the CT scan monitor and discussed with Dr. Fam at 1731.   This report was  finalized on 6/4/2019 9:22 PM by DR. Givoani Blanton MD.      Ct Cerebral Perfusion With & Without Contrast    Result Date: 6/5/2019  There is no evidence of core infarct or reversible ischemia.  D:  06/04/2019 E:  06/05/2019   This report was finalized on 6/5/2019 9:21 AM by Dr. Geoffrey Sosa MD.      Echo:  Results for orders placed during the hospital encounter of 06/04/19   Adult Transthoracic Echo Complete W/ Cont if Necessary Per Protocol (With Agitated Saline)    Narrative · Left ventricular systolic function is normal. Estimated EF = 65%.  · Saline test results are positive for evidence of a patent foramen ovale   present.          Results: Reviewed.  I reviewed the patient's new laboratory and imaging results.  I independently reviewed the patient's new images.    Medications: Reviewed.    Assessment/Plan   A / P     Assessment:    74 y.o.female, admitted on 6/4/2019 with Acute CVA (cerebrovascular accident) (CMS/[facility]) [I63.9]:     1. AIS vs TIA (Right sided weakness)  1. S/p tPA  2. Ulcerative colitis  3. Dyslipidemia  4. Blindness O.D.  5. Anxiety / Chronic   6. T2DM      Results from last 7 days   Lab Units 06/05/19  1611 06/05/19  1151 06/05/19  0858 06/05/19  0501 06/04/19  2259 06/04/19  1736   GLUCOSE mg/dL 172* 130 204* 123 165* 132*     Lab Results   Lab Value Date/Time    HGBA1C 5.90 (H) 06/05/2019 0419     Diet: Diet Regular; Cardiac, Consistent Carbohydrate   Advance Directives: Code Status and Medical Interventions:   Ordered at: 06/04/19 1853     Code Status:    CPR     Medical Interventions (Level of Support Prior to Arrest):    Full        Plan:    1. CT Head follow later today (18:00)   2. Goal: Glucose < 180 mg/dL.  1. Insulin basal, prandial and correction  3. Disposition: Keep in ICU.    Plan of care and goals reviewed during interdisciplinary rounds.  I discussed the patient's findings and my recommendations with patient    Level of Risk is High due to:  illness with threat to life or bodily  function     Time: 25 minutes, in direct patient care, with the patient and/or on the mehta coordinating care with other health care providers.     I have spent > 50% percent of this time, counseling and discussing management.     Christo Wooten MD, FACP, FCCP, CNSC  Intensive Care Medicine, Nutrition Support and Pulmonary Medicine     [x]  Primary Attending  []  Consultant

## 2019-06-05 NOTE — CONSULTS
Patient does not meet diabetes education order criteria A1c>7.5%, therefore patient was not seen for diabetes education at this time. A1c was 5.9% Please re consult as needed.

## 2019-06-05 NOTE — PLAN OF CARE
Problem: Patient Care Overview  Goal: Interprofessional Rounds/Family Conf  Outcome: Ongoing (interventions implemented as appropriate)   06/05/19 0934   Interdisciplinary Rounds/Family Conf   Summary Spoke with Dr. Wooten in rounds this morning regarding plan of care. I informed him that neurologically she is stable. NIH:2 due to baseline blindness in right eye from previous injury. Alert and oriented to person, place, time, and situation. She does have a history of dementia. No right sided extremity drifitng. However, right foot push/pull is weaker than left. Up with PT this morning walking in [place]. Right pupil irregular nonreactive (baseline). Left pupil round, reactive to light. No difficulty speaking. States she feels almost back to baseline. Stated she did not sleep at all last night. VSS. Passed bedside dysphagia screen. UOP adequate. Liquid BM's. (History of ulcerative colitis). Dr. Wooten did not want to reorder home dose of nightly valium at this time. Will continue to monitor.    Participants ;dietitian/nutrition services;nursing;pharmacy;physician

## 2019-06-05 NOTE — PROGRESS NOTES
"Clinical Nutrition Note      Patient Name: Isabel Neil  MRN: [id number]  Admission date: 6/4/2019      Multidisciplinary Rounds    Additional information obtained during MDR:  RN reports pt has essentially returned to baseline after tPA  w/ NIH SS of 2 , the 2 is d/t R eye blindness which was d/t an old injury . Pt eating , walking, plan for ECHO and neurology consult today.    Current diet: Diet Regular; Cardiac, Consistent Carbohydrate    Pertinent medical data reviewed  No nutrition risk identified on admission screen; MST score \"0\"    Intervention:  Menus provided; pt advised of alternate selections  Plan of care and goals reviewed    Monitor:  RD to follow per protocol      Gail Tabares MS,RD,LD  06/05/19 10:23 AM  Time: 20 min       "

## 2019-06-05 NOTE — H&P
INTENSIVIST   PROGRESS NOTE     Hospital:  LOS: 1 day      S     Ms. Isabel Neil, 74 y.o. female is followed for:    AIS    Type 2 diabetes mellitus with complication, without long-term current use of insulin (CMS/[facility])    Ulcerative colitis with complication (CMS/[facility])    Acute CVA (cerebrovascular accident) (CMS/[facility])    As an Intensivist, we provide an integrated approach to the ICU patient and family, medical management of comorbid conditions, lead interdisciplinary rounds and coordinate the care with all other services, including those from other specialists.     Interval History:  Much better.  No neuro deficit.  Walked with PT.     The patient's relevant past medical, surgical and social history were reviewed and updated in Epic as appropriate.     ROS:   Constitutional: Negative for fever.   Respiratory: Negative for dyspnea.   Cardiovascular: Negative for chest pain.   Gastrointestinal: Negative for  nausea, vomiting and diarrhea.        O     Vitals:  Temp: 98.2 °F (36.8 °C) (06/05/19 1200) Temp  Min: 98.2 °F (36.8 °C)  Max: 99.3 °F (37.4 °C)   BP: 136/94 (06/05/19 1530) BP  Min: 69/50  Max: 169/73   Pulse: 77 (06/05/19 1530) Pulse  Min: 77  Max: 129   Resp: 16 (06/05/19 1500) Resp  Min: 14  Max: 20   SpO2: 94 % (06/05/19 1530) SpO2  Min: 92 %  Max: 96 %   Device: room air (06/05/19 1500)    Flow Rate:   No Data Recorded     Physical Examination  Telemetry:  Rhythm: sinus tachycardia (06/05/19 1400)         Constitutional:  No acute distress.  Thin.   Cardiovascular: Normal rate, regular and rhythm. Normal heart sounds.  No murmurs, gallop or rub.   Respiratory: No respiratory distress.  Normal breath sounds  No adventitious sounds    Abdominal:  Soft with no tenderness  No distension. No HSM.   Extremities: Warm with no cyanosis.  No peripheral edema.   Neurological:   Alert and Oriented to person, place, and time.  Best Eye Response: 4-->(E4) spontaneous (06/05/19 1400)  Best Motor Response: 6-->(M6)  obeys commands (06/05/19 1400)  Best Verbal Response: 5-->(V5) oriented (06/05/19 1400)  [place] Coma Scale Score: 15 (06/05/19 1400)   Lines/Drains/Airways: Peripheral IV(s)     Total (NIH Stroke Scale): 2 (06/05/19 0700)    Hematology:  Results from last 7 days   Lab Units 06/05/19 0419 06/04/19 1739 06/04/19  1736   WBC 10*3/mm3 8.32 8.23  --    HEMOGLOBIN g/dL 12.0 13.0  --    HEMOGLOBIN, POC g/dL  --   --  13.3   MCV fL 90.0 89.1  --    PLATELETS 10*3/mm3 265 300  --        Chemistry:  Estimated Creatinine Clearance: 46.2 mL/min (by C-G formula based on SCr of 0.92 mg/dL).    Results from last 7 days   Lab Units 06/05/19  0419   SODIUM mmol/L 142   POTASSIUM mmol/L 4.0   CHLORIDE mmol/L 104   CO2 mmol/L 25.0   ANION GAP mmol/L 13.0   GLUCOSE mg/dL 130*   CALCIUM mg/dL 9.6     Results from last 7 days   Lab Units 06/05/19 0419 06/04/19  1736   BUN mg/dL 18  --    CREATININE mg/dL 0.92 1.30           Hepatic:  Results from last 7 days   Lab Units 06/05/19  0419   ALK PHOS U/L 104   BILIRUBIN mg/dL 0.4   ALT (SGPT) U/L 5   AST (SGOT) U/L 16   ALBUMIN g/dL 4.30       Images:  Mri Brain Without Contrast    Result Date: 6/4/2019  Moderate atrophy with moderate chronic ischemic changes around the ventricles. No evidence of recent infarct. Signer Name: Nawaf Malin MD  Signed: 6/4/2019 9:50 PM  Workstation Name: [id number]     Xr Chest 1 View    Result Date: 6/5/2019  Chronic change; no acute disease.  D:  06/04/2019 E:  06/05/2019  This report was finalized on 6/5/2019 9:21 AM by Dr. Geoffrey Sosa MD.      Ct Head Without Contrast Stroke Protocol    Result Date: 6/4/2019  Age-appropriate atrophy and fairly extensive but chronic appearing central white matter changes. No evidence of intracranial hemorrhage, no well-defined infarct, or other clearly acute renal disease.    Note: Exam time is shown as 1727; study was reviewed the CT scan monitor and discussed with Dr. Fam at 1731.   This report was finalized on  6/4/2019 9:22 PM by DR. Giovani Blanton MD.      Ct Cerebral Perfusion With & Without Contrast    Result Date: 6/5/2019  There is no evidence of core infarct or reversible ischemia.  D:  06/04/2019 E:  06/05/2019   This report was finalized on 6/5/2019 9:21 AM by Dr. Geoffrey Sosa MD.      Echo:  Results for orders placed during the hospital encounter of 06/04/19   Adult Transthoracic Echo Complete W/ Cont if Necessary Per Protocol (With Agitated Saline)    Narrative · Left ventricular systolic function is normal. Estimated EF = 65%.  · Saline test results are positive for evidence of a patent foramen ovale   present.          Results: Reviewed.  I reviewed the patient's new laboratory and imaging results.  I independently reviewed the patient's new images.    Medications: Reviewed.    Assessment/Plan   A / P     Assessment:    74 y.o.female, admitted on 6/4/2019 with Acute CVA (cerebrovascular accident) (CMS/[facility]) [I63.9]:     1. AIS vs TIA (Right sided weakness)  1. S/p tPA  2. Ulcerative colitis  3. Dyslipidemia  4. Blindness O.D.  5. Anxiety / Chronic   6. T2DM      Results from last 7 days   Lab Units 06/05/19  1611 06/05/19  1151 06/05/19  0858 06/05/19  0501 06/04/19  2259 06/04/19  1736   GLUCOSE mg/dL 172* 130 204* 123 165* 132*     Lab Results   Lab Value Date/Time    HGBA1C 5.90 (H) 06/05/2019 0419     Diet: Diet Regular; Cardiac, Consistent Carbohydrate   Advance Directives: Code Status and Medical Interventions:   Ordered at: 06/04/19 1853     Code Status:    CPR     Medical Interventions (Level of Support Prior to Arrest):    Full        Plan:    1. CT Head follow later today (18:00)   2. Goal: Glucose < 180 mg/dL.  1. Insulin basal, prandial and correction  3. Disposition: Keep in ICU.    Plan of care and goals reviewed during interdisciplinary rounds.  I discussed the patient's findings and my recommendations with patient    Level of Risk is High due to:  illness with threat to life or bodily function      Time: 25 minutes, in direct patient care, with the patient and/or on the mehta coordinating care with other health care providers.     I have spent > 50% percent of this time, counseling and discussing management.     Christo Wooten MD, FACP, FCCP, CNSC  Intensive Care Medicine, Nutrition Support and Pulmonary Medicine     [x]  Primary Attending  []  Consultant

## 2019-06-05 NOTE — PLAN OF CARE
Problem: Patient Care Overview  Goal: Plan of Care Review  Outcome: Ongoing (interventions implemented as appropriate)   06/05/19 0820   Coping/Psychosocial   Plan of Care Reviewed With patient;daughter   OTHER   Outcome Summary Pt demonstrates functional limitations in bed mobility, transfers, gait, and strength. Notable deficits include residual right-sided weakness and poor proprioceptive feedback in RLE, affecting ability to safely and independently perform functional activities. Pt's limitations and evolving clinical presentation warrants skilled PT rehabilitation to restore function. Anticipate discharge to inpatient rehabilitation pending progress with interventions.

## 2019-06-05 NOTE — PLAN OF CARE
Problem: Patient Care Overview  Goal: Plan of Care Review  Outcome: Ongoing (interventions implemented as appropriate)   06/05/19 1503   Coping/Psychosocial   Plan of Care Reviewed With patient;family   Plan of Care Review   Progress improving   OTHER   Outcome Summary Patient remains neurologically stable. NIH 2 for baseline blindness of right eye. Alert and oriented. Anxious at times. Tolerating RA with 02 sat >92%. Lungs clear. HR 80s-140s. MD notified of tachycardia. Home Metoprolol reordered (Lopressor 50mg 2x/day). HR now 80s and no complaints of pressure in chest. SBP 140s-160s. Will administer aspirin after CT head results this evening. Tolerating diet. Multiple bowel movements. Up with 1 assist. Echo and carotid duplex completed and results pending. Will continue to monitor.        Problem: Fall Risk (Adult)  Goal: Absence of Fall  Outcome: Ongoing (interventions implemented as appropriate)   06/05/19 1503   Fall Risk (Adult)   Absence of Fall making progress toward outcome       Problem: Skin Injury Risk (Adult)  Goal: Skin Health and Integrity  Outcome: Ongoing (interventions implemented as appropriate)   06/05/19 1503   Skin Injury Risk (Adult)   Skin Health and Integrity making progress toward outcome       Problem: Thrombolytic Therapy (Adult)  Goal: Signs and Symptoms of Listed Potential Problems Will be Absent, Minimized or Managed (Thrombolytic Therapy)  Outcome: Ongoing (interventions implemented as appropriate)   06/05/19 1503   Goal/Outcome Evaluation   Problems Assessed (Thrombolytic Therapy) all   Problems Assessed (Thrombolytic Therapy) none       Problem: Stroke (Ischemic) (Adult)  Goal: Signs and Symptoms of Listed Potential Problems Will be Absent, Minimized or Managed (Stroke)  Outcome: Ongoing (interventions implemented as appropriate)   06/05/19 1503   Goal/Outcome Evaluation   Problems Assessed (Stroke (Ischemic)) all   Problems Assessed (Stroke (Ischemic)) communication  impairment;motor/sensory impairment

## 2019-06-05 NOTE — THERAPY EVALUATION
Acute Care - Physical Therapy Initial Evaluation  [facility]     Patient Name: Isabel Neil  : 1945  MRN: [id number]  Today's Date: 2019   Onset of Illness/Injury or Date of Surgery: (P) 19  Date of Referral to PT: (P) 19  Referring Physician: Maria Elena MORTENSEN (P)      Admit Date: 2019    Visit Dx:     ICD-10-CM ICD-9-CM   1. Acute CVA (cerebrovascular accident) (CMS/HCC) I63.9 434.91   2. Impaired functional mobility, balance, gait, and endurance Z74.09 V49.89   3. Impaired mobility and ADLs Z74.09 799.89     Patient Active Problem List   Diagnosis   • AIS   • Type 2 diabetes mellitus with complication, without long-term current use of insulin (CMS/HCC)   • Ulcerative colitis with complication (CMS/HCC)   • Acute CVA (cerebrovascular accident) (CMS/HCC)     Past Medical History:   Diagnosis Date   • Anxiety    • Blindness of right eye     Post trauma   • Colon polyp    • COPD (chronic obstructive pulmonary disease) (CMS/[facility])    • CVA (cerebral vascular accident) (CMS/HCC) 2019   • Dementia    • Depression    • Diabetes mellitus (CMS/[facility])    • Dyslipidemia    • History of irregular heartbeat    • Ulcerative colitis (CMS/HCC)      Past Surgical History:   Procedure Laterality Date   • COLONOSCOPY W/ POLYPECTOMY     • HYSTERECTOMY          PT ASSESSMENT (last 12 hours)      Physical Therapy Evaluation     Row Name 19 0820          PT Evaluation Time/Intention    Subjective Information  complains of;weakness  (Pended)   -NW     Document Type  evaluation  (Pended)   -NW     Mode of Treatment  physical therapy  (Pended)   -NW     Patient Effort  excellent  (Pended)   -NW     Row Name 19 0820          General Information    Patient Profile Reviewed?  yes  (Pended)   -NW     Onset of Illness/Injury or Date of Surgery  19  (Pended)   -NW     Referring Physician  Maria Elena MORTENSEN  (Pended)   -NW     Patient Observations  alert;cooperative;agree to therapy  (Pended)   -NW      Prior Level of Function  independent:;gait;transfer;bed mobility;ADL's;all household mobility;community mobility  (Pended)   -NW     Equipment Currently Used at Home  none  (Pended)   -NW     Pertinent History of Current Functional Problem  pt presented to ED on 6/4/19 with c/o right-sided weakness and difficulty speaking noticed while washing dished. Subsequent TPA administered.  (Pended)   -NW     Existing Precautions/Restrictions  fall  (Pended)   -NW     Risks Reviewed  patient and family:;LOB  (Pended)   -NW     Benefits Reviewed  patient and family:;improve function;increase strength;increase independence;increase balance;increase knowledge  (Pended)   -NW     Barriers to Rehab  none identified  (Pended)   -NW     Row Name 06/05/19 0820          Relationship/Environment    Primary Source of Support/Comfort  child(quita)  (Pended)   -NW     Lives With  child(quita), adult  (Pended)   -NW     Row Name 06/05/19 0820          Resource/Environmental Concerns    Current Living Arrangements  home/apartment/condo  (Pended)  pt resides in basement of daughter's home  -NW     Resource/Environmental Concerns  none  (Pended)   -NW     Row Name 06/05/19 0820          Cognitive Assessment/Interventions    Additional Documentation  Cognitive Assessment/Intervention (Group)  (Pended)   -NW     Row Name 06/05/19 0820          Cognitive Assessment/Intervention- PT/OT    Affect/Mental Status (Cognitive)  WFL  (Pended)   -NW     Orientation Status (Cognition)  oriented x 4;person;place;situation;time  (Pended)   -NW     Follows Commands (Cognition)  WFL  (Pended)   -NW     Cognitive Function (Cognitive)  WFL  (Pended)   -NW     Safety Deficit (Cognitive)  mild deficit;safety precautions awareness  (Pended)   -NW     Personal Safety Interventions  fall prevention program maintained;gait belt;nonskid shoes/slippers when out of bed  (Pended)   -NW     Row Name 06/05/19 0820          Safety Issues, Functional Mobility    Safety Issues  Affecting Function (Mobility)  safety precaution awareness  (Pended)   -NW     Impairments Affecting Function (Mobility)  balance;coordination;strength;endurance/activity tolerance;sensation/sensory awareness  (Pended)   -     Row Name 06/05/19 0820          Transfer Assessment/Treatment    Transfer Assessment/Treatment  sit-stand transfer;stand-sit transfer  (Pended)   -NW     Maintains Weight-bearing Status (Transfers)  able to maintain  (Pended)   -NW     Sit-Stand [place] (Transfers)  minimum assist (75% patient effort);1 person assist  (Pended)   -NW     Stand-Sit [place] (Transfers)  contact guard  (Pended)   -     Row Name 06/05/19 0820          Sit-Stand Transfer    Assistive Device (Sit-Stand Transfers)  walker, front-wheeled  (Pended)   -     Row Name 06/05/19 0820          Stand-Sit Transfer    Assistive Device (Stand-Sit Transfers)  walker, front-wheeled  (Pended)   -     Row Name 06/05/19 0820          Gait/Stairs Assessment/Training    Gait/Stairs Assessment/Training  gait/ambulation independence;gait/ambulation assistive device  (Pended)   -NW     [place] Level (Gait)  contact guard;verbal cues  (Pended)   -NW     Assistive Device (Gait)  walker, front-wheeled  (Pended)   -NW     Distance in Feet (Gait)  200  (Pended)   -NW     Pattern (Gait)  step-through  (Pended)   -NW     Deviations/Abnormal Patterns (Gait)  gait speed decreased;stride length decreased  (Pended)   -NW     Bilateral Gait Deviations  forward flexed posture  (Pended)   -NW     Comment (Gait/Stairs)  mild VC to promote safe amb using RWx. Poor proprioceptive feedback noted in RLE causing frequent visual assurance to maintain awareness; mildy delayed RLE advancement, corrected with VC  (Pended)   -     Row Name 06/05/19 0820          General ROM    GENERAL ROM COMMENTS  WFL  (Pended)   -     Row Name 06/05/19 0820          MMT (Manual Muscle Testing)    General MMT Comments  RLE: 4/5, grossly; LLE: WFL,  grossly  (Pended)   -NW     Row Name 06/05/19 0820 06/05/19 0800       Motor Assessment/Intervention    Additional Documentation  Balance (Group);Gross Motor Coordination (Group);Therapeutic Exercise (Group)  (Pended)   -  --  (Pended)   -NW    Row Name 06/05/19 0820          Therapeutic Exercise    11094 - PT Therapeutic Activity Minutes  15  (Pended)   -NW     Row Name 06/05/19 0820          Gross Motor Coordination    Gross Motor Impairments  coordination  (Pended)   -     Gross Motor Skill, Impairments Detail  mild difficulty during right heel to left shin  (Pended)   -NW     Row Name 06/05/19 0820 06/05/19 0800       Balance    Balance  static sitting balance;static standing balance;dynamic sitting balance  (Pended)   -  --  (Pended)   -NW    Row Name 06/05/19 0820          Static Sitting Balance    Level of [place] (Unsupported Sitting, Static Balance)  contact guard assist  (Pended)   -     Sitting Position (Unsupported Sitting, Static Balance)  sitting on edge of bed  (Pended)   -     Level of [place] (Supported Sitting, Static Balance)  independent  (Pended)   -     Sitting Position (Supported Sitting, Static Balance)  sitting in chair  (Pended)   -NW     Row Name 06/05/19 0820          Dynamic Sitting Balance    Level of [place], Reaches Outside Midline (Sitting, Dynamic Balance)  contact guard assist  (Pended)   -     Sitting Position, Reaches Outside Midline (Sitting, Dynamic Balance)  sitting on edge of bed  (Pended)   -NW     Row Name 06/05/19 0820          Static Standing Balance    Level of [place] (Supported Standing, Static Balance)  contact guard assist  (Pended)   -     Time Able to Maintain Position (Supported Standing, Static Balance)  1 to 2 minutes  (Pended)   -     Assistive Device Utilized (Supported Standing, Static Balance)  walker, rolling  (Pended)   -NW     Row Name 06/05/19 0820          Sensory Assessment/Intervention    Sensory General  Assessment  no sensation deficits identified  (Pended)  for light touch BLE. Noted RLE proprioceptive deficits.  -     Row Name 06/05/19 0820          Pain Assessment    Additional Documentation  Pain Scale: Numbers Pre/Post-Treatment (Group)  (Pended)   -[facility] Name 06/05/19 0820          Pain Scale: Numbers Pre/Post-Treatment    Pain Scale: Numbers, Pretreatment  0/10 - no pain  (Pended)   -     Pain Scale: Numbers, Post-Treatment  0/10 - no pain  (Pended)   -[facility] Name 06/05/19 0820          Coping    Observed Emotional State  accepting;calm;cooperative  (Pended)   -     Verbalized Emotional State  acceptance  (Pended)   -[facility] Name 06/05/19 0820          Plan of Care Review    Plan of Care Reviewed With  patient;daughter  (Pended)   -[facility] Name 06/05/19 0820          Physical Therapy Clinical Impression    Date of Referral to PT  06/05/19  (Pended)   -     PT Diagnosis (PT Clinical Impression)  impaired functional mobility, gait, balance, and strength  (Pended)   -     Patient/Family Goals Statement (PT Clinical Impression)  return to PLOF at home  (Pended)   -     Criteria for Skilled Interventions Met (PT Clinical Impression)  yes;treatment indicated  (Pended)   -NW     Impairments Found (describe specific impairments)  aerobic capacity/endurance;gait, locomotion, and balance;muscle performance;sensory Integrity  (Pended)   -     Rehab Potential (PT Clinical Summary)  good, to achieve stated therapy goals  (Pended)   -     Care Plan Review (PT)  evaluation/treatment results reviewed;care plan/treatment goals reviewed;risks/benefits reviewed;patient/other agree to care plan  (Pended)   -     Care Plan Review, Other Participant (PT Clinical Impression)  daughter  (Pended)   -[facility] Name 06/05/19 0820          Vital Signs    Pre Systolic BP Rehab  --  (Pended)  OT in room upon arrival; refer to OT note  -NW     Pre Treatment Diastolic BP  --  (Pended)  OT in room upon  arrival; refer to OT note  -NW     Post Systolic BP Rehab  153  (Pended)   -NW     Post Treatment Diastolic BP  78  (Pended)   -NW     Pretreatment Heart Rate (beats/min)  --  (Pended)  OT in room upon arrival; refer to OT note  -NW     Posttreatment Heart Rate (beats/min)  106  (Pended)   -NW     Pre SpO2 (%)  --  (Pended)  OT in room upon arrival; refer to OT note  -NW     O2 Delivery Pre Treatment  room air  (Pended)   -NW     Post SpO2 (%)  93  (Pended)   -NW     O2 Delivery Post Treatment  room air  (Pended)   -NW     Pre Patient Position  Sitting  (Pended)  EOB with OT  -NW     Intra Patient Position  Standing  (Pended)   -NW     Post Patient Position  Sitting  (Pended)   -NW     Row Name 06/05/19 0820          Physical Therapy Goals    Bed Mobility Goal Selection (PT)  bed mobility, PT goal 1  (Pended)   -NW     Transfer Goal Selection (PT)  transfer, PT goal 1  (Pended)   -NW     Gait Training Goal Selection (PT)  gait training, PT goal 1  (Pended)   -NW     Additional Documentation  Balance Goal Selection (PT) (Row)  (Pended)   -NW     Row Name 06/05/19 0820          Bed Mobility Goal 1 (PT)    Activity/Assistive Device (Bed Mobility Goal 1, PT)  sit to supine;supine to sit  (Pended)   -NW     [place] Level/Cues Needed (Bed Mobility Goal 1, PT)  independent  (Pended)   -NW     Time Frame (Bed Mobility Goal 1, PT)  long term goal (LTG);10 days  (Pended)   -NW     Progress/Outcomes (Bed Mobility Goal 1, PT)  goal ongoing  (Pended)   -NW     Row Name 06/05/19 0820          Transfer Goal 1 (PT)    Activity/Assistive Device (Transfer Goal 1, PT)  sit-to-stand/stand-to-sit  (Pended)   -NW     [place] Level/Cues Needed (Transfer Goal 1, PT)  independent  (Pended)   -NW     Time Frame (Transfer Goal 1, PT)  long term goal (LTG);10 days  (Pended)   -NW     Progress/Outcome (Transfer Goal 1, PT)  goal ongoing  (Pended)   -NW     Row Name 06/05/19 0820          Gait Training Goal 1 (PT)    Activity/Assistive  Device (Gait Training Goal 1, PT)  gait (walking locomotion);improve balance and speed  (Pended)   -NW     [place] Level (Gait Training Goal 1, PT)  supervision required  (Pended)   -NW     Distance (Gait Goal 1, PT)  350  (Pended)   -NW     Time Frame (Gait Training Goal 1, PT)  long term goal (LTG);10 days  (Pended)   -NW     Progress/Outcome (Gait Training Goal 1, PT)  goal ongoing  (Pended)   -NW     Row Name 06/05/19 0820          Patient Education Goal (PT)    Activity (Patient Education Goal, PT)  HEP  (Pended)   -NW     [place]/Cues/Accuracy (Memory Goal 2, PT)  independent;verbalizes understanding  (Pended)   -NW     Time Frame (Patient Education Goal, PT)  long term goal (LTG);10 days  (Pended)   -NW     Progress/Outcome (Patient Education Goal, PT)  goal ongoing  (Pended)   -NW     Row Name 06/05/19 0820          Positioning and Restraints    Pre-Treatment Position  in bed  (Pended)  sitting EOB with OT present  -NW     Post Treatment Position  chair  (Pended)   -NW     In Chair  notified nsg;reclined;sitting;call light within reach;encouraged to call for assist;exit alarm on;with family/caregiver;legs elevated  (Pended)   -NW     Row Name 06/05/19 0820          Living Environment    Home Accessibility  other (see comments)  (Pended)  no stairs to enter; no stairs within  -NW       User Key  (r) = Recorded By, (t) = Taken By, (c) = Cosigned By    Initials Name Provider Type    Roland Tyler, PT Student PT Student        Physical Therapy Education     Title: PT OT SLP Therapies (In Progress)     Topic: Physical Therapy (Done)     Point: Mobility training (Done)     Learning Progress Summary           Patient Acceptance, E, NR,VU by NW at 6/5/2019  8:20 AM   Family Acceptance, E, NR,VU by NW at 6/5/2019  8:20 AM                   Point: Home exercise program (Done)     Learning Progress Summary           Patient Acceptance, E, NR,VU by NW at 6/5/2019  8:20 AM   Family Acceptance, E, NR,VU by  NW at 6/5/2019  8:20 AM                   Point: Body mechanics (Done)     Learning Progress Summary           Patient Acceptance, E, NR,VU by NW at 6/5/2019  8:20 AM   Family Acceptance, E, NR,VU by NW at 6/5/2019  8:20 AM                   Point: Precautions (Done)     Learning Progress Summary           Patient Acceptance, E, NR,VU by NW at 6/5/2019  8:20 AM   Family Acceptance, E, NR,VU by NW at 6/5/2019  8:20 AM                               User Key     Initials Effective Dates Name Provider Type Discipline     05/03/19 -  Roland Vasquez E, PT Student PT Student PT              PT Recommendation and Plan  Anticipated Discharge Disposition (PT): (P) inpatient rehabilitation facility  Planned Therapy Interventions (PT Eval): (P) balance training, home exercise program, bed mobility training, gait training, strengthening, transfer training, patient/family education  Therapy Frequency (PT Clinical Impression): (P) daily  Outcome Summary/Treatment Plan (PT)  Anticipated Discharge Disposition (PT): (P) inpatient rehabilitation facility  Plan of Care Reviewed With: (P) patient, daughter  Outcome Summary: (P) Pt demonstrates functional limitations in bed mobility, transfers, gait, and strength. Notable deficits include residual right-sided weakness and poor proprioceptive feedback in RLE, affecting ability to safely and independently perform functional activities. Pt's limitations and evolving clinical presentation warrants skilled PT rehabilitation to restore function. Anticipate discharge to inpatient rehabilitation pending progress with interventions.  Outcome Measures     Row Name 06/05/19 0820 06/05/19 0758          How much help from another person do you currently need...    Turning from your back to your side while in flat bed without using bedrails?  3  (Pended)   -NW  --     Moving from lying on back to sitting on the side of a flat bed without bedrails?  3  (Pended)   -NW  --     Moving to and from a bed to  a chair (including a wheelchair)?  3  (Pended)   -NW  --     Standing up from a chair using your arms (e.g., wheelchair, bedside chair)?  3  (Pended)   -NW  --     Climbing 3-5 steps with a railing?  3  (Pended)   -NW  --     To walk in hospital room?  3  (Pended)   -NW  --     AM-PAC 6 Clicks Score  18  (Pended)   -NILE (r) NW (t)  --        How much help from another is currently needed...    Putting on and taking off regular lower body clothing?  --  2  (Pended)   -CH     Bathing (including washing, rinsing, and drying)  --  2  (Pended)   -CH     Toileting (which includes using toilet bed pan or urinal)  --  2  (Pended)   -CH     Putting on and taking off regular upper body clothing  --  3  (Pended)   -CH     Taking care of personal grooming (such as brushing teeth)  --  3  (Pended)   -CH     Eating meals  --  3  (Pended)   -     Score  --  15  (Pended)   -        Modified [place] Scale    Pre-Stroke Modified [place] Scale  0 - No Symptoms at all.  (Pended)   -NW  0 - No Symptoms at all.  (Pended)   -     Modified [place] Scale  3 - Moderate disability.  Requiring some help, but able to walk without assistance.  (Pended)   -NW  3 - Moderate disability.  Requiring some help, but able to walk without assistance.  (Pended)   -        Functional Assessment    Outcome Measure Options  AM-PAC 6 Clicks Basic Mobility (PT)  (Pended)   -NW  AM-PAC 6 Clicks Daily Activity (OT);Modified [place]  (Pended)   -       User Key  (r) = Recorded By, (t) = Taken By, (c) = Cosigned By    Initials Name Provider Type    Roland Tyler, PT Student PT Student    Karen Colunga, RN Registered Nurse    Rvai Valladares, OT Student OT Student         Time Calculation:   PT Charges     Row Name 06/05/19 0820             Time Calculation    Start Time  0820  (Pended)   -NW      PT Received On  06/05/19  (Pended)   -      PT Goal Re-Cert Due Date  06/15/19  (Pended)   -         Time Calculation- PT    Total Timed Code  Minutes- PT  15 minute(s)  (Pended)   -NW         Timed Charges    39877 - PT Therapeutic Activity Minutes  15  (Pended)   -NW        User Key  (r) = Recorded By, (t) = Taken By, (c) = Cosigned By    Initials Name Provider Type     Roland Vasquez, PT Student PT Student        Therapy Charges for Today     Code Description Service Date Service Provider Modifiers Qty    24564045412  PT THERAPEUTIC ACT EA 15 MIN 6/5/2019 Roland Vasquez, PT Student GP 1    [id number]  PT EVAL MOD COMPLEXITY 4 6/5/2019 Roland Vasquez, PT Student GP 1          PT G-Codes  Outcome Measure Options: (P) AM-PAC 6 Clicks Basic Mobility (PT)  AM-PAC 6 Clicks Score: (P) 18  Score: (P) 15  Modified [place] Scale: (P) 3 - Moderate disability.  Requiring some help, but able to walk without assistance.      Roland Vasquez, PT Student  6/5/2019

## 2019-06-05 NOTE — PROGRESS NOTES
"Discharge Planning Assessment  [facility]     Patient Name: Isabel Neil  MRN: [id number]  Today's Date: 6/5/2019    Admit Date: 6/4/2019    Discharge Needs Assessment     Row Name 06/05/19 0755       Living Environment    Lives With  child(quita), adult    Name(s) of Who Lives With Patient  Delmi  dtr    Current Living Arrangements  home/apartment/condo    Primary Care Provided by  self    Provides Primary Care For  no one    Family Caregiver if Needed  child(quita), adult    Family Caregiver Names  Gaye    Quality of Family Relationships  involved;supportive;helpful    Able to Return to Prior Arrangements  yes    Living Arrangement Comments  Pt lives her daughter in [place], [place].       Resource/Environmental Concerns    Resource/Environmental Concerns  none       Transition Planning    Patient/Family Anticipates Transition to  home with family    Patient/Family Anticipated Services at Transition  none    Transportation Anticipated  family or friend will provide       Discharge Needs Assessment    Readmission Within the Last 30 Days  no previous admission in last 30 days    Concerns to be Addressed  no discharge needs identified    Equipment Currently Used at Home  glucometer    Anticipated Changes Related to Illness  none    Equipment Needed After Discharge  none        Discharge Plan     Row Name 06/05/19 0759       Plan    Plan  Home with daughter    Patient/Family in Agreement with Plan  yes    Plan Comments  Met with patient and daughter Delmi at . Pt lives with daughter in [place], [place] the daughter is RN.  Pt is independent with ADL's. Pt denies using HH or DME. Pt has Rx coverage with her insurance. Pt sees Dominick Soares PCP.    Patient ambulated 200ft with RW  CG. The daughter states patient \"SO\" would be available to transport pt to outpt PT. They want to go to [facility] in [place]. The daughter will arrange, I will give her the phone number and order to take  with her.        Final Discharge " Disposition Code  01 - home or self-care        Destination      No service coordination in this encounter.      Durable Medical Equipment      No service coordination in this encounter.      Dialysis/Infusion      No service coordination in this encounter.      Home Medical Care      No service coordination in this encounter.      Therapy      No service coordination in this encounter.      Community Resources      No service coordination in this encounter.        Expected Discharge Date and Time     Expected Discharge Date Expected Discharge Time    Flo 10, 2019         Demographic Summary     Row Name 06/05/19 0753       General Information    Admission Type  inpatient    Arrived From  emergency department    Referral Source  admission list    Reason for Consult  discharge planning    Preferred Language  English       Contact Information    Permission Granted to Share Info With          Functional Status     Row Name 06/05/19 0754       Functional Status    Usual Activity Tolerance  excellent       Functional Status, IADL    Medications  independent    Meal Preparation  independent    Housekeeping  independent    Laundry  independent    Shopping  independent        Psychosocial    No documentation.       Abuse/Neglect    No documentation.       Legal    No documentation.       Substance Abuse    No documentation.       Patient Forms    No documentation.           [employer] RN

## 2019-06-05 NOTE — PLAN OF CARE
Problem: Patient Care Overview  Goal: Plan of Care Review  Outcome: Ongoing (interventions implemented as appropriate)   06/05/19 3400   Coping/Psychosocial   Plan of Care Reviewed With daughter;patient   OTHER   Outcome Summary OT completed a brief chart review. CGA for bed mobility requiring extra time to complete. Pt exhibited mild R lateral lean in sitting. Recommend CONT skilled IPOT POC. Recommend D/C to inpatient rehabilitation.

## 2019-06-05 NOTE — PLAN OF CARE
Problem: Patient Care Overview  Goal: Plan of Care Review  Outcome: Ongoing (interventions implemented as appropriate)   06/05/19 1326   Coping/Psychosocial   Plan of Care Reviewed With patient;daughter   SLP evaluation completed. Will address  Speech, language and cognition. Please see note for further details and recommendations.      Problem: Stroke (Ischemic) (Adult)  Goal: Signs and Symptoms of Listed Potential Problems Will be Absent, Minimized or Managed (Stroke)  Outcome: Ongoing (interventions implemented as appropriate)   06/05/19 1326   Goal/Outcome Evaluation   Problems Assessed (Stroke (Ischemic)) cognitive impairment;communication impairment   Problems Assessed (Stroke (Ischemic)) cognitive impairment;communication impairment

## 2019-06-06 ENCOUNTER — APPOINTMENT (OUTPATIENT)
Dept: CARDIOLOGY | Facility: HOSPITAL | Age: 74
End: 2019-06-06

## 2019-06-06 PROBLEM — H54.40 BLIND RIGHT EYE: Status: ACTIVE | Noted: 2019-06-06

## 2019-06-06 PROBLEM — Q21.12 PFO (PATENT FORAMEN OVALE): Status: ACTIVE | Noted: 2019-06-06

## 2019-06-06 PROBLEM — I63.9 CVA (CEREBRAL VASCULAR ACCIDENT) (HCC): Status: RESOLVED | Noted: 2019-06-04 | Resolved: 2019-06-06

## 2019-06-06 PROBLEM — G45.9 TIA (TRANSIENT ISCHEMIC ATTACK): Status: ACTIVE | Noted: 2019-06-05

## 2019-06-06 PROBLEM — E78.5 HYPERLIPIDEMIA LDL GOAL <70: Status: ACTIVE | Noted: 2019-06-06

## 2019-06-06 LAB
ANION GAP SERPL CALCULATED.3IONS-SCNC: 13 MMOL/L
BASOPHILS # BLD AUTO: 0.08 10*3/MM3 (ref 0–0.2)
BASOPHILS NFR BLD AUTO: 1 % (ref 0–1.5)
BUN BLD-MCNC: 18 MG/DL (ref 8–23)
BUN/CREAT SERPL: 21.2 (ref 7–25)
CALCIUM SPEC-SCNC: 10 MG/DL (ref 8.6–10.5)
CHLORIDE SERPL-SCNC: 104 MMOL/L (ref 98–107)
CO2 SERPL-SCNC: 25 MMOL/L (ref 22–29)
CREAT BLD-MCNC: 0.85 MG/DL (ref 0.57–1)
DEPRECATED RDW RBC AUTO: 44.5 FL (ref 37–54)
EOSINOPHIL # BLD AUTO: 0.46 10*3/MM3 (ref 0–0.4)
EOSINOPHIL NFR BLD AUTO: 5.6 % (ref 0.3–6.2)
ERYTHROCYTE [DISTWIDTH] IN BLOOD BY AUTOMATED COUNT: 13.7 % (ref 12.3–15.4)
GFR SERPL CREATININE-BSD FRML MDRD: 65 ML/MIN/1.73
GLUCOSE BLD-MCNC: 112 MG/DL (ref 65–99)
GLUCOSE BLDC GLUCOMTR-MCNC: 114 MG/DL (ref 70–130)
GLUCOSE BLDC GLUCOMTR-MCNC: 117 MG/DL (ref 70–130)
GLUCOSE BLDC GLUCOMTR-MCNC: 128 MG/DL (ref 70–130)
GLUCOSE BLDC GLUCOMTR-MCNC: 188 MG/DL (ref 70–130)
HCT VFR BLD AUTO: 39.4 % (ref 34–46.6)
HGB BLD-MCNC: 12.8 G/DL (ref 12–15.9)
IMM GRANULOCYTES # BLD AUTO: 0.02 10*3/MM3 (ref 0–0.05)
IMM GRANULOCYTES NFR BLD AUTO: 0.2 % (ref 0–0.5)
LYMPHOCYTES # BLD AUTO: 2.99 10*3/MM3 (ref 0.7–3.1)
LYMPHOCYTES NFR BLD AUTO: 36.6 % (ref 19.6–45.3)
MCH RBC QN AUTO: 28.8 PG (ref 26.6–33)
MCHC RBC AUTO-ENTMCNC: 32.5 G/DL (ref 31.5–35.7)
MCV RBC AUTO: 88.7 FL (ref 79–97)
MONOCYTES # BLD AUTO: 0.79 10*3/MM3 (ref 0.1–0.9)
MONOCYTES NFR BLD AUTO: 9.7 % (ref 5–12)
NEUTROPHILS # BLD AUTO: 3.83 10*3/MM3 (ref 1.7–7)
NEUTROPHILS NFR BLD AUTO: 46.9 % (ref 42.7–76)
PLATELET # BLD AUTO: 278 10*3/MM3 (ref 140–450)
PMV BLD AUTO: 10.1 FL (ref 6–12)
POTASSIUM BLD-SCNC: 3.8 MMOL/L (ref 3.5–5.2)
RBC # BLD AUTO: 4.44 10*6/MM3 (ref 3.77–5.28)
SODIUM BLD-SCNC: 142 MMOL/L (ref 136–145)
TROPONIN T SERPL-MCNC: <0.01 NG/ML (ref 0–0.03)
WBC NRBC COR # BLD: 8.17 10*3/MM3 (ref 3.4–10.8)

## 2019-06-06 PROCEDURE — 99232 SBSQ HOSP IP/OBS MODERATE 35: CPT | Performed by: INTERNAL MEDICINE

## 2019-06-06 PROCEDURE — 85025 COMPLETE CBC W/AUTO DIFF WBC: CPT | Performed by: INTERNAL MEDICINE

## 2019-06-06 PROCEDURE — 63710000001 INSULIN LISPRO (HUMAN) PER 5 UNITS: Performed by: INTERNAL MEDICINE

## 2019-06-06 PROCEDURE — 93893 TCD STD ICR ART VEN-ART SHNT: CPT

## 2019-06-06 PROCEDURE — 99232 SBSQ HOSP IP/OBS MODERATE 35: CPT | Performed by: PSYCHIATRY & NEUROLOGY

## 2019-06-06 PROCEDURE — 82962 GLUCOSE BLOOD TEST: CPT

## 2019-06-06 PROCEDURE — 97116 GAIT TRAINING THERAPY: CPT

## 2019-06-06 PROCEDURE — 92507 TX SP LANG VOICE COMM INDIV: CPT

## 2019-06-06 PROCEDURE — 63710000001 INSULIN DETEMIR PER 5 UNITS: Performed by: INTERNAL MEDICINE

## 2019-06-06 PROCEDURE — 97530 THERAPEUTIC ACTIVITIES: CPT

## 2019-06-06 PROCEDURE — 80048 BASIC METABOLIC PNL TOTAL CA: CPT | Performed by: INTERNAL MEDICINE

## 2019-06-06 PROCEDURE — 84484 ASSAY OF TROPONIN QUANT: CPT | Performed by: NURSE PRACTITIONER

## 2019-06-06 PROCEDURE — 99221 1ST HOSP IP/OBS SF/LOW 40: CPT | Performed by: INTERNAL MEDICINE

## 2019-06-06 PROCEDURE — 63710000001 INSULIN LISPRO (HUMAN) PER 5 UNITS: Performed by: NURSE PRACTITIONER

## 2019-06-06 RX ADMIN — ASPIRIN 325 MG ORAL TABLET 325 MG: 325 PILL ORAL at 08:12

## 2019-06-06 RX ADMIN — INSULIN LISPRO 2 UNITS: 100 INJECTION, SOLUTION INTRAVENOUS; SUBCUTANEOUS at 20:18

## 2019-06-06 RX ADMIN — INSULIN LISPRO 5 UNITS: 100 INJECTION, SOLUTION INTRAVENOUS; SUBCUTANEOUS at 08:13

## 2019-06-06 RX ADMIN — METOPROLOL TARTRATE 50 MG: 50 TABLET ORAL at 08:12

## 2019-06-06 RX ADMIN — ATORVASTATIN CALCIUM 80 MG: 40 TABLET, FILM COATED ORAL at 20:02

## 2019-06-06 RX ADMIN — INSULIN LISPRO 5 UNITS: 100 INJECTION, SOLUTION INTRAVENOUS; SUBCUTANEOUS at 12:27

## 2019-06-06 RX ADMIN — METOPROLOL TARTRATE 50 MG: 50 TABLET ORAL at 20:02

## 2019-06-06 RX ADMIN — SODIUM CHLORIDE, PRESERVATIVE FREE 3 ML: 5 INJECTION INTRAVENOUS at 08:12

## 2019-06-06 RX ADMIN — SERTRALINE HYDROCHLORIDE 50 MG: 50 TABLET ORAL at 08:12

## 2019-06-06 RX ADMIN — INSULIN DETEMIR 15 UNITS: 100 INJECTION, SOLUTION SUBCUTANEOUS at 20:02

## 2019-06-06 RX ADMIN — INSULIN LISPRO 5 UNITS: 100 INJECTION, SOLUTION INTRAVENOUS; SUBCUTANEOUS at 17:38

## 2019-06-06 RX ADMIN — SODIUM CHLORIDE, PRESERVATIVE FREE 3 ML: 5 INJECTION INTRAVENOUS at 20:03

## 2019-06-06 RX ADMIN — TIMOLOL MALEATE 1 DROP: 5 SOLUTION/ DROPS OPHTHALMIC at 20:02

## 2019-06-06 NOTE — PROGRESS NOTES
Clinical Nutrition Note      Patient Name: Isabel Neil  MRN: [id number]  Admission date: 6/4/2019      Multidisciplinary Rounds    Additional information obtained during MDR:  RN reports pt doing well, PFO found on ECHO , carotid duplex is pending. Plan for PFO per cardiology. Pt able to go home w. OP PT rehab at [facility].    Current diet: Diet Regular; Cardiac, Consistent Carbohydrate    Pertinent medical data reviewed    Intervention:  Plan of care and goals reviewed    Monitor:  RD to follow per protocol      Gail Tabares MS,RD,LD  06/06/19 11:28 AM  Time: 20 min

## 2019-06-06 NOTE — PLAN OF CARE
Problem: Patient Care Overview  Goal: Plan of Care Review  Outcome: Ongoing (interventions implemented as appropriate)   06/06/19 8844   OTHER   Outcome Summary Patient neuro checks intact. NIH 3 for right eye blindness and slight RLE drift, drift has since improved. Required 2LNC while sleeping to maintain appropriate O2 sats. Pt has rested much more tonight. No c/o pain, numbness/tingling. No complaints at this time. VSS, will continue to monitor.      Goal: Individualization and Mutuality  Outcome: Ongoing (interventions implemented as appropriate)    Goal: Discharge Needs Assessment  Outcome: Ongoing (interventions implemented as appropriate)    Goal: Interprofessional Rounds/Family Conf  Outcome: Ongoing (interventions implemented as appropriate)      Problem: Fall Risk (Adult)  Goal: Identify Related Risk Factors and Signs and Symptoms  Outcome: Ongoing (interventions implemented as appropriate)    Goal: Absence of Fall  Outcome: Ongoing (interventions implemented as appropriate)      Problem: Skin Injury Risk (Adult)  Goal: Identify Related Risk Factors and Signs and Symptoms  Outcome: Ongoing (interventions implemented as appropriate)    Goal: Skin Health and Integrity  Outcome: Ongoing (interventions implemented as appropriate)      Problem: Thrombolytic Therapy (Adult)  Goal: Signs and Symptoms of Listed Potential Problems Will be Absent, Minimized or Managed (Thrombolytic Therapy)  Outcome: Ongoing (interventions implemented as appropriate)      Problem: Stroke (Ischemic) (Adult)  Goal: Signs and Symptoms of Listed Potential Problems Will be Absent, Minimized or Managed (Stroke)  Outcome: Ongoing (interventions implemented as appropriate)

## 2019-06-06 NOTE — PROGRESS NOTES
INTENSIVIST   PROGRESS NOTE     Hospital:  LOS: 2 days      S     Ms. Isabel Neil, 74 y.o. female is followed for:    AIS    Type 2 diabetes mellitus with complication, without long-term current use of insulin (CMS/[facility])    Ulcerative colitis with complication (CMS/[facility])    Acute CVA (cerebrovascular accident) (CMS/[facility])    As an Intensivist, we provide an integrated approach to the ICU patient and family, medical management of comorbid conditions, lead interdisciplinary rounds and coordinate the care with all other services, including those from other specialists.     Interval History:  Doing well.  No deficits.     The patient's relevant past medical, surgical and social history were reviewed and updated in Epic as appropriate.     ROS:   Constitutional: Negative for fever.   Respiratory: Negative for dyspnea.   Cardiovascular: Negative for chest pain.   Gastrointestinal: Negative for  nausea, vomiting and diarrhea.        O     Vitals:  Temp: 98 °F (36.7 °C) (06/06/19 1200) Temp  Min: 97.7 °F (36.5 °C)  Max: 99.5 °F (37.5 °C)   BP: 128/68 (06/06/19 1400) BP  Min: 119/73  Max: 169/77   Pulse: 89 (06/06/19 1400) Pulse  Min: 70  Max: 103   Resp: 20 (06/06/19 1200) Resp  Min: 16  Max: 20   SpO2: 96 % (06/06/19 1400) SpO2  Min: 91 %  Max: 98 %   Device: room air (06/06/19 1400)    Flow Rate: 2 (06/06/19 0800) Flow (L/min)  Min: 2  Max: 2     Physical Examination  Telemetry:  Rhythm: normal sinus rhythm (06/06/19 1400)         Constitutional:  No acute distress.  Thin.   Cardiovascular: Normal rate, regular and rhythm. Normal heart sounds.  No murmurs, gallop or rub.   Respiratory: No respiratory distress.  Normal breath sounds  No adventitious sounds    Abdominal:  Soft with no tenderness  No distension. No HSM.   Extremities: Warm with no cyanosis.  No peripheral edema.   Neurological:   Alert and Oriented to person, place, and time.  Best Eye Response: 3-->(E3) to speech (06/06/19 1400)  Best Motor Response:  6-->(M6) obeys commands (06/06/19 1400)  Best Verbal Response: 5-->(V5) oriented (06/06/19 1400)  Lynnette Coma Scale Score: 14 (06/06/19 1400)   Lines/Drains/Airways: Peripheral IV(s)     NIH Stroke Scale  Interval: (shift change) (06/06/19 0700)  1a. Level of Consciousness: 1-->Not alert, but arousable by minor stimulation to obey, answer, or respond (06/06/19 0700)  1b. LOC Questions: 0-->Answers both questions correctly (06/06/19 0700)  1c. LOC Commands: 0-->Performs both tasks correctly (06/06/19 0700)  2. Best Gaze: 0-->Normal (06/06/19 0700)  3. Visual: 2-->Complete hemianopia(R eye blindness is baseline per patient) (06/06/19 0700)  4. Facial Palsy: 1-->Minor paralysis (flattened nasolabial fold, asymmetry on smiling) (06/06/19 0700)  5a. Motor Arm, Left: 0-->No drift, limb holds 90 (or 45) degrees for full 10 secs (06/06/19 0700)  5b. Motor Arm, Right: 0-->No drift, limb holds 90 (or 45) degrees for full 10 secs (06/06/19 0700)  6a. Motor Leg, Left: 0-->No drift, leg holds 30 degree position for full 5 secs (06/06/19 0700)  6b. Motor Leg, Right: 0-->No drift, leg holds 30 degree position for full 5 secs (06/06/19 0700)  7. Limb Ataxia: 0-->Absent (06/06/19 0700)  8. Sensory: 0-->Normal, no sensory loss (06/06/19 0700)  9. Best Language: 0-->No aphasia, normal (06/06/19 0700)  10. Dysarthria: 0-->Normal (06/06/19 0700)  11. Extinction and Inattention (formerly Neglect): 0-->No abnormality (06/06/19 0700)  Total (NIH Stroke Scale): 4 (06/06/19 0700)    Hematology:  Results from last 7 days   Lab Units 06/06/19  0425 06/05/19  0419 06/04/19  1739   WBC 10*3/mm3 8.17 8.32 8.23   HEMOGLOBIN g/dL 12.8 12.0 13.0   MCV fL 88.7 90.0 89.1   PLATELETS 10*3/mm3 278 265 300       Chemistry:  Estimated Creatinine Clearance: 50 mL/min (by C-G formula based on SCr of 0.85 mg/dL).    Results from last 7 days   Lab Units 06/06/19 0425 06/05/19 0419   SODIUM mmol/L 142 142   POTASSIUM mmol/L 3.8 4.0   CHLORIDE mmol/L 104 104    CO2 mmol/L 25.0 25.0   ANION GAP mmol/L 13.0 13.0   GLUCOSE mg/dL 112* 130*   CALCIUM mg/dL 10.0 9.6     Results from last 7 days   Lab Units 06/06/19  0425 06/05/19  0419 06/04/19  1736   BUN mg/dL 18 18  --    CREATININE mg/dL 0.85 0.92 1.30     Images:  Ct Head Without Contrast    Result Date: 6/5/2019  Chronic appearing changes of the aging brain, including white matter disease similar to previous brain MRI.. No evidence of acute intracranial disease.   This report was finalized on 6/5/2019 6:30 PM by DR. Giovani Blanton MD.      Mri Brain Without Contrast    Result Date: 6/4/2019  Moderate atrophy with moderate chronic ischemic changes around the ventricles. No evidence of recent infarct. Signer Name: Nawaf Malin MD  Signed: 6/4/2019 9:50 PM  Workstation Name: [id number]     Xr Chest 1 View    Result Date: 6/5/2019  Chronic change; no acute disease.  D:  06/04/2019 E:  06/05/2019  This report was finalized on 6/5/2019 9:21 AM by Dr. Geoffrey Sosa MD.      Ct Head Without Contrast Stroke Protocol    Result Date: 6/4/2019  Age-appropriate atrophy and fairly extensive but chronic appearing central white matter changes. No evidence of intracranial hemorrhage, no well-defined infarct, or other clearly acute renal disease.    Note: Exam time is shown as 1727; study was reviewed the CT scan monitor and discussed with Dr. Fam at 1731.   This report was finalized on 6/4/2019 9:22 PM by DR. Giovani Blanton MD.      Ct Cerebral Perfusion With & Without Contrast    Result Date: 6/5/2019  There is no evidence of core infarct or reversible ischemia.  D:  06/04/2019 E:  06/05/2019   This report was finalized on 6/5/2019 9:21 AM by Dr. Geoffrey Sosa MD.      Echo:  Results for orders placed during the hospital encounter of 06/04/19   Adult Transthoracic Echo Complete W/ Cont if Necessary Per Protocol (With Agitated Saline)    Narrative · Left ventricular systolic function is normal. Estimated EF = 65%.  · Saline test results are  positive for evidence of a patent foramen ovale   present.          Results: Reviewed.  I reviewed the patient's new laboratory and imaging results.  I independently reviewed the patient's new images.    Medications: Reviewed.    Assessment/Plan   A / P     Assessment:    74 y.o.female, admitted on 6/4/2019 with Acute CVA (cerebrovascular accident) (CMS/[facility]) [I63.9]:     1. Probable left hemispheric AIS vs TIA (Right sided weakness)  1. S/p tPA  2. ECHO (+) saline test. Probable PFO  2. Ulcerative colitis  3. Dyslipidemia  4. Blindness O.D.  5. Anxiety / Chronic   6. T2DM on Metformin at home.      Results from last 7 days   Lab Units 06/06/19  1125 06/06/19  0717 06/05/19  2052 06/05/19  1611 06/05/19  1151 06/05/19  0858   GLUCOSE mg/dL 117 114 179* 172* 130 204*     Lab Results   Lab Value Date/Time    HGBA1C 5.90 (H) 06/05/2019 0419     Diet: Diet Regular; Cardiac, Consistent Carbohydrate   Advance Directives: Code Status and Medical Interventions:   Ordered at: 06/04/19 1853     Code Status:    CPR     Medical Interventions (Level of Support Prior to Arrest):    Full        Plan:    1. No plans for PFO closure as per Cardiology  2. TCD in progress.  3. Anticoagulation as per Cardiology, for crytogenic A Fib, when OK with Neurology.  4. Goal: Glucose < 180 mg/dL.  1. Insulin basal, prandial and correction  5. Disposition: Discharge Home.} When OK with Neurology.  1. F/U with Cardiology in 6 weeks. (Event monitor or similar).  2. Outpatient PT at [place].    Plan of care and goals reviewed during interdisciplinary rounds.  I discussed the patient's findings and my recommendations with patient    Level of Risk is High due to:  illness with threat to life or bodily function     Time: 25 minutes, in direct patient care, with the patient and/or on the mehta coordinating care with other health care providers.     I have spent > 50% percent of this time, counseling and discussing management.     Christo Wooten MD,  FACP, FCCP, CNSC  Intensive Care Medicine, Nutrition Support and Pulmonary Medicine     [x]  Primary Attending  []  Consultant

## 2019-06-06 NOTE — CONSULTS
"Inpatient Cardiology Consult  Consult performed by: Jeyson Baum IV, MD  Consult ordered by: Christo Wooten MD  Reason for consult: TIA with PFO        [facility]  Cardiology Progress Note       The patient is a 74-year-old female with diabetes, hypertension, hyperlipidemia who was admitted to [facility] on 6/4/2019 with right-sided weakness and expressive aphasia.  The patient was given TPA in the ER with resolution of symptoms.  An echocardiogram was performed which showed mildly positive bubble study with Valsalva.  The patient, and daughter (who is a nurse), deny any diagnosis of atrial fibrillation.  However, she was seen by cardiologist 20 years ago for \"fast heart rate\" and was prescribed beta-blocker therapy.  The patient was not taking aspirin prior to admission.      Past medical, surgical, social and family history reviewed in the patient's electronic medical record.    Review of Systems:   Pertinent positives listed in HPI all other systems reviewed and were negative.       Vital Sign Min/Max for last 24 hours  Temp  Min: 97.7 °F (36.5 °C)  Max: 99.5 °F (37.5 °C)   BP  Min: 108/79  Max: 169/77   Pulse  Min: 70  Max: 129   Resp  Min: 16  Max: 20   SpO2  Min: 91 %  Max: 98 %   Flow (L/min)  Min: 2  Max: 2      Intake/Output Summary (Last 24 hours) at 6/6/2019 1427  Last data filed at 6/6/2019 1200  Gross per 24 hour   Intake 601 ml   Output --   Net 601 ml           Physical Exam   Constitutional: She is oriented to person, place, and time. She appears well-developed and well-nourished.   HENT:   Head: Normocephalic and atraumatic.   Eyes: Pupils are equal, round, and reactive to light. No scleral icterus.   Neck: No JVD present. Carotid bruit is not present. No thyromegaly present.   Cardiovascular: Normal rate, regular rhythm, S1 normal and S2 normal. Exam reveals no gallop.   No murmur heard.  Pulmonary/Chest: Effort normal and breath sounds normal. "   Abdominal: Soft. She exhibits no distension. There is no hepatosplenomegaly. There is no tenderness.   Musculoskeletal: She exhibits no edema.   Neurological: She is alert and oriented to person, place, and time.   Skin: Skin is warm and dry. No cyanosis. Nails show no clubbing.   Psychiatric: She has a normal mood and affect. Her behavior is normal.     Tele: Normal sinus rhythm    Results Review (reviewed the patient's recent labs in the electronic medical record):     EKG: Normal sinus rhythm 6/4/2019          Results from last 7 days   Lab Units 06/06/19  0425 06/05/19  0419 06/04/19  1736   SODIUM mmol/L 142 142  --    POTASSIUM mmol/L 3.8 4.0  --    CHLORIDE mmol/L 104 104  --    BUN mg/dL 18 18  --    CREATININE mg/dL 0.85 0.92 1.30     Results from last 7 days   Lab Units 06/06/19  1117   TROPONIN T ng/mL <0.010     Results from last 7 days   Lab Units 06/06/19  0425 06/05/19  0419 06/04/19  1739   WBC 10*3/mm3 8.17 8.32 8.23   HEMOGLOBIN g/dL 12.8 12.0 13.0   HEMATOCRIT % 39.4 36.9 39.3   PLATELETS 10*3/mm3 278 265 300       Lab Results   Component Value Date    HGBA1C 5.90 (H) 06/05/2019       Lab Results   Component Value Date    CHOL 133 06/05/2019    TRIG 235 (H) 06/05/2019    HDL 30 (L) 06/05/2019    LDL 56 06/05/2019              Active Hospital Problems    Diagnosis POA   • **TIA (transient ischemic attack) vs aborted CVA [G45.9] Yes     Priority: High     · CT cerebral perfusion (6/4/2019): No evidence of acute ischemia  · MRI of the brain (6/4/2019): Moderate atrophy with moderate chronic ischemic changes around the ventricles. No evidence of recent infarct  · CT of the head without contrast (6/5/2019):Chronic appearing changes of the aging brain.  No evidence of acute intracranial disease.     • PFO (patent foramen ovale) [Q21.1] Not Applicable     Priority: Medium     · Echo (6/5/2019): Saline results positive indicating PFO  · RoPE score 1 suggesting 0% chance that stroke/TIA related to PFO      • Hyperlipidemia LDL goal <70 [E78.5] Yes   • Blind right eye [H54.40] Yes   • Essential hypertension [I10] No   • Type 2 diabetes mellitus with complication, without long-term current use of insulin (CMS/[facility]) [E11.8] Yes   • Ulcerative colitis with complication (CMS/[facility]) [K51.919] Yes     74-year-old female with multiple risk factors for atrial fibrillation including age, gender, diabetes, hypertension.  The patient has a weakly positive bubble study and likely patent foramen ovale.  Given her age and cardiovascular risk factors, paradoxical embolism as the cause of her TIA or aborted stroke is of very low likelihood.  I would concentrate on more likely scenario of occult atrial fibrillation and treat her empirically with anticoagulation.  I do not recommend PFO closure in this patient.         · Do not recommend PFO closure  · 30-day mobile cardiac outpatient telemetry monitor  · Anticoagulation with Eliquis 5 mg bid once deemed okay with neurology  · Follow-up with Ashley Camara in 6 weeks to review monitor     Jeyson Baum IV, MD  6/6/2019

## 2019-06-06 NOTE — PLAN OF CARE
Problem: Patient Care Overview  Goal: Plan of Care Review  Outcome: Ongoing (interventions implemented as appropriate)   06/06/19 1340   Coping/Psychosocial   Plan of Care Reviewed With patient   Plan of Care Review   Progress improving   OTHER   Outcome Summary Pt demonstrated increased indep with STS transfer; progressed forward ambulation distance to 350 total ft with use of RW and CGA. Noted improvement in RLE coordination. Edu pt/ S.O. re: seated HEP. Based upon current level of function, pt would be safe to d/c home with OP PT (vs HHPT pending social support). Will cont to monitor appropriate AD for home use.        Problem: Stroke (Ischemic) (Adult)  Goal: Signs and Symptoms of Listed Potential Problems Will be Absent, Minimized or Managed (Stroke)  Outcome: Ongoing (interventions implemented as appropriate)   06/06/19 1340   Goal/Outcome Evaluation   Problems Assessed (Stroke (Ischemic)) cognitive impairment;communication impairment;motor/sensory impairment   Problems Assessed (Stroke (Ischemic)) motor/sensory impairment

## 2019-06-06 NOTE — THERAPY TREATMENT NOTE
Acute Care - Physical Therapy Treatment Note  [facility]     Patient Name: Isabel Neil  : 1945  MRN: [id number]  Today's Date: 2019  Onset of Illness/Injury or Date of Surgery: 19  Date of Referral to PT: 19  Referring Physician: Maria Elena MORTENSEN    Admit Date: 2019    Visit Dx:    ICD-10-CM ICD-9-CM   1. Acute CVA (cerebrovascular accident) (CMS/[facility]) I63.9 434.91   2. Impaired functional mobility, balance, gait, and endurance Z74.09 V49.89   3. Impaired mobility and ADLs Z74.09 799.89     Patient Active Problem List   Diagnosis   • Essential hypertension   • Type 2 diabetes mellitus with complication, without long-term current use of insulin (CMS/[facility])   • Ulcerative colitis with complication (CMS/[facility])   • TIA (transient ischemic attack) vs aborted CVA   • Hyperlipidemia LDL goal <70   • PFO (patent foramen ovale)   • Blind right eye       Therapy Treatment    Rehabilitation Treatment Summary     Row Name 19 1420 19 1340          Treatment Time/Intention    Discipline  speech language pathologist  (Pended)   -MP  physical therapist  -LS     Document Type  therapy note (daily note)  (Pended)   -MP  therapy note (daily note)  -LS     Subjective Information  no complaints  (Pended)   -MP  no complaints  -LS     Mode of Treatment  individual therapy;speech-language pathology  (Pended)   -  physical therapy  -LS     Patient/Family Observations  S.O. present  (Pended)   -MP  S.O. at bedside  -LS     Care Plan Review  care plan/treatment goals reviewed;patient/other agree to care plan  (Pended)   -MP  --     Therapy Frequency ([facility])  evaluation only;other (see comments)  (Pended)  updated  -MP  --     Patient Effort  excellent  (Pended)   -MP  excellent  -LS     Existing Precautions/Restrictions  --  fall  -LS     Recorded by [MP] Sriram Jamison, MS, CFY-SLP 19 1517 [LS] Anita Chase, PT 19 1512     Row Name 19 1340             Vital Signs    Pre Systolic BP  Rehab  127  -LS      Pre Treatment Diastolic BP  68  -LS      Pretreatment Heart Rate (beats/min)  86  -LS      Posttreatment Heart Rate (beats/min)  90  -LS      Pre SpO2 (%)  96  -LS      O2 Delivery Pre Treatment  room air  -LS      Post SpO2 (%)  96  -LS      O2 Delivery Post Treatment  room air  -LS      Pre Patient Position  Sitting  -LS      Intra Patient Position  Standing  -LS      Post Patient Position  Sitting  -LS      Recorded by [LS] Anita Chase, PT 06/06/19 1512      Row Name 06/06/19 1340             Cognitive Assessment/Intervention- PT/OT    Affect/Mental Status (Cognitive)  WFL  -LS      Orientation Status (Cognition)  oriented x 4  -LS      Follows Commands (Cognition)  follows one step commands;over 90% accuracy;verbal cues/prompting required  -LS      Cognitive Function (Cognitive)  safety deficit  -LS      Safety Deficit (Cognitive)  mild deficit;insight into deficits/self awareness;safety precautions awareness  -LS      Personal Safety Interventions  fall prevention program maintained;gait belt;nonskid shoes/slippers when out of bed  -LS      Recorded by [LS] Anita Chase, PT 06/06/19 1512      Row Name 06/06/19 1340             Bed Mobility Assessment/Treatment    Comment (Bed Mobility)  UIC  -LS      Recorded by [LS] Anita Chase, PT 06/06/19 1512      Row Name 06/06/19 1340             Sit-Stand Transfer    Sit-Stand [place] (Transfers)  contact guard;verbal cues  -LS      Assistive Device (Sit-Stand Transfers)  walker, front-wheeled  -LS      Recorded by [LS] Anita Chase, PT 06/06/19 1512      Row Name 06/06/19 1340             Stand-Sit Transfer    Stand-Sit [place] (Transfers)  contact guard;verbal cues  -LS      Assistive Device (Stand-Sit Transfers)  walker, front-wheeled  -LS      Recorded by [LS] Anita Chase, PT 06/06/19 1512      Row Name 06/06/19 1340             Gait/Stairs Assessment/Training    60456 - Gait Training Minutes   12  -LS      Gait/Stairs  Assessment/Training  gait/ambulation assistive device  -LS      [place] Level (Gait)  contact guard;verbal cues  -LS      Assistive Device (Gait)  walker, front-wheeled  -LS      Distance in Feet (Gait)  350  -LS      Pattern (Gait)  step-through  -LS      Deviations/Abnormal Patterns (Gait)  stride length decreased;gait speed decreased;christopher decreased  -LS      Bilateral Gait Deviations  forward flexed posture  -LS      Comment (Gait/Stairs)  VC's for upright posture and maintaining UE  on RW handles.   -LS      Recorded by [LS] Anita Chase, PT 06/06/19 1512      Row Name 06/06/19 1340             Motor Skills Assessment/Interventions    Additional Documentation  Balance (Group);Therapeutic Exercise (Group)  -LS      Recorded by [LS] Anita Chase, PT 06/06/19 1512      Row Name 06/06/19 1340             Therapeutic Exercise    Therapeutic Exercise  seated, lower extremities  -LS      Additional Documentation  Therapeutic Exercise (Row)  -LS      63664 - PT Therapeutic Exercise Minutes  3  -LS      98942 - PT Therapeutic Activity Minutes  8  -LS      Recorded by [LS] Anita Chase, PT 06/06/19 1512      Row Name 06/06/19 1340             Lower Extremity Seated Therapeutic Exercise    Performed, Seated Lower Extremity (Therapeutic Exercise)  hip flexion/extension;LAQ (long arc quad), knee extension;ankle dorsiflexion/plantarflexion  -LS      Exercise Type, Seated Lower Extremity (Therapeutic Exercise)  AROM (active range of motion)  -LS      Sets/Reps Detail, Seated Lower Extremity (Therapeutic Exercise)  1/10  -LS      Recorded by [LS] Anita Chase, PT 06/06/19 1512      Row Name 06/06/19 1340             Static Sitting Balance    Level of [place] (Unsupported Sitting, Static Balance)  supervision  -LS      Sitting Position (Unsupported Sitting, Static Balance)  sitting in chair  -LS      Recorded by [LS] Anita Chase, PT 06/06/19 1512      Row Name 06/06/19 1340             Static Standing  Balance    Level of [place] (Supported Standing, Static Balance)  supervision  -LS      Assistive Device Utilized (Supported Standing, Static Balance)  walker, rolling  -LS      Recorded by [LS] Anita Chase, PT 06/06/19 1512      Row Name 06/06/19 1340             Positioning and Restraints    Pre-Treatment Position  sitting in chair/recliner  -LS      Post Treatment Position  chair  -LS      In Chair  notified nsg;call light within reach;encouraged to call for assist;with family/caregiver;sitting  -LS      Recorded by [LS] Anita Chase, PT 06/06/19 1512      Row Name 06/06/19 1420             Pain Assessment    Additional Documentation  Pain Scale: FACES Pre/Post-Treatment (Group)  (Pended)   -MP      Recorded by [MP] Sriram Jamison MS, CFY-SLP 06/06/19 1517      Row Name 06/06/19 1340             Pain Scale: Numbers Pre/Post-Treatment    Pain Scale: Numbers, Pretreatment  0/10 - no pain  -LS      Pain Scale: Numbers, Post-Treatment  0/10 - no pain  -LS      Recorded by [LS] Anita Chase, PT 06/06/19 1512      Row Name 06/06/19 1340             Plan of Care Review    Plan of Care Reviewed With  patient  -LS      Recorded by [LS] Anita Chase, PT 06/06/19 1512      Row Name 06/06/19 1340             Outcome Summary/Treatment Plan (PT)    Daily Summary of Progress (PT)  progress toward functional goals is good  -LS      Anticipated Discharge Disposition (PT)  home with assist;home with OP services vs HHPT pending social support  -LS      Recorded by [LS] Anita Chase, PT 06/06/19 1512        User Key  (r) = Recorded By, (t) = Taken By, (c) = Cosigned By    Initials Name Effective Dates Discipline    LS Anita Chase, PT 06/19/15 -  PT    MP Sriram Jamison MS, CFY-SLP 08/15/18 -  SLP                   Physical Therapy Education     Title: PT OT SLP Therapies (In Progress)     Topic: Physical Therapy (In Progress)     Point: Mobility training (In Progress)     Learning Progress Summary            Patient Acceptance, E,D, NR by LS at 6/6/2019  1:40 PM    Acceptance, E, NR,VU by NW at 6/5/2019  8:20 AM   Family Acceptance, E, NR,VU by NW at 6/5/2019  8:20 AM   Significant Other Acceptance, E,D, NR by LS at 6/6/2019  1:40 PM                   Point: Home exercise program (In Progress)     Learning Progress Summary           Patient Acceptance, E,D, NR by LS at 6/6/2019  1:40 PM    Acceptance, E, NR,VU by NW at 6/5/2019  8:20 AM   Family Acceptance, E, NR,VU by NW at 6/5/2019  8:20 AM   Significant Other Acceptance, E,D, NR by LS at 6/6/2019  1:40 PM                   Point: Body mechanics (In Progress)     Learning Progress Summary           Patient Acceptance, E,D, NR by LS at 6/6/2019  1:40 PM    Acceptance, E, NR,VU by NW at 6/5/2019  8:20 AM   Family Acceptance, E, NR,VU by NW at 6/5/2019  8:20 AM   Significant Other Acceptance, E,D, NR by LS at 6/6/2019  1:40 PM                   Point: Precautions (In Progress)     Learning Progress Summary           Patient Acceptance, E,D, NR by LS at 6/6/2019  1:40 PM    Acceptance, E, NR,VU by NW at 6/5/2019  8:20 AM   Family Acceptance, E, NR,VU by NW at 6/5/2019  8:20 AM   Significant Other Acceptance, E,D, NR by LS at 6/6/2019  1:40 PM                               User Key     Initials Effective Dates Name Provider Type Discipline     06/19/15 -  Anita Chase, PT Physical Therapist PT     05/03/19 -  Roland Vasquez, PT Student PT Student PT                PT Recommendation and Plan  Anticipated Discharge Disposition (PT): home with assist, home with OP services(vs HHPT pending social support)  Outcome Summary/Treatment Plan (PT)  Daily Summary of Progress (PT): progress toward functional goals is good  Anticipated Discharge Disposition (PT): home with assist, home with OP services(vs HHPT pending social support)  Plan of Care Reviewed With: patient  Progress: improving  Outcome Summary: Pt demonstrated increased indep with STS transfer; progressed forward  ambulation distance to 350 total ft with use of RW and CGA. Noted improvement in RLE coordination. Edu pt/ S.O. re: seated HEP. Based upon current level of function, pt would be safe to d/c home with OP PT (vs HHPT pending social support). Will cont to monitor appropriate AD for home use.   Outcome Measures     Row Name 06/06/19 1340 06/05/19 0820 06/05/19 0758       How much help from another person do you currently need...    Turning from your back to your side while in flat bed without using bedrails?  3  -LS  3  -LS (r) NW (t) LS (c)  --    Moving from lying on back to sitting on the side of a flat bed without bedrails?  3  -LS  3  -LS (r) NW (t) LS (c)  --    Moving to and from a bed to a chair (including a wheelchair)?  3  -LS  3  -LS (r) NW (t) LS (c)  --    Standing up from a chair using your arms (e.g., wheelchair, bedside chair)?  3  -LS  3  -LS (r) NW (t) LS (c)  --    Climbing 3-5 steps with a railing?  3  -LS  3  -LS (r) NW (t) LS (c)  --    To walk in hospital room?  3  -LS  3  -LS (r) NW (t) LS (c)  --    AM-PAC 6 Clicks Score  18  -LS  18  -LS (r) NW (t)  --       How much help from another is currently needed...    Putting on and taking off regular lower body clothing?  --  --  2  -LS (r) CH (t) LS (c)    Bathing (including washing, rinsing, and drying)  --  --  2  -LS (r) CH (t) LS (c)    Toileting (which includes using toilet bed pan or urinal)  --  --  2  -LS (r) CH (t) LS (c)    Putting on and taking off regular upper body clothing  --  --  3  -LS (r) CH (t) LS (c)    Taking care of personal grooming (such as brushing teeth)  --  --  3  -LS (r) CH (t) LS (c)    Eating meals  --  --  3  -LS (r) CH (t) LS (c)    Score  --  --  15  -LS (r) CH (t)       Modified [place] Scale    Pre-Stroke Modified [place] Scale  --  0 - No Symptoms at all.  -LS (r) NW (t) LS (c)  0 - No Symptoms at all.  -LS (r) CH (t) LS (c)    Modified [place] Scale  --  3 - Moderate disability.  Requiring some help, but able to  walk without assistance.  -LS (r) NW (t) LS (c)  3 - Moderate disability.  Requiring some help, but able to walk without assistance.  -LS (r) CH (t) LS (c)       Functional Assessment    Outcome Measure Options  AM-PAC 6 Clicks Daily Activity (OT)  -LS  AM-PAC 6 Clicks Basic Mobility (PT)  -LS (r) NW (t) LS (c)  AM-PAC 6 Clicks Daily Activity (OT);Modified [place]  -LS (r) CH (t) LS (c)      User Key  (r) = Recorded By, (t) = Taken By, (c) = Cosigned By    Initials Name Provider Type    LS Anita Chase, PT Physical Therapist    NW Roland Vasquez, PT Student PT Student    CH Ravi Nickerson, OT Student OT Student         Time Calculation:   PT Charges     Row Name 06/06/19 1340             Time Calculation    Start Time  1340  -      PT Received On  06/06/19  -         Time Calculation- PT    Total Timed Code Minutes- PT  23 minute(s)  -         Timed Charges    91759 - PT Therapeutic Exercise Minutes  3  -LS      20672 - Gait Training Minutes   12  -LS      81518 - PT Therapeutic Activity Minutes  8  -LS        User Key  (r) = Recorded By, (t) = Taken By, (c) = Cosigned By    Initials Name Provider Type     Anita Chase, PT Physical Therapist        Therapy Charges for Today     Code Description Service Date Service Provider Modifiers Qty    67474478229 HC GAIT TRAINING EA 15 MIN 6/6/2019 Anita Chase, PT GP 1    02351541713 HC PT THER SUPP EA 15 MIN 6/6/2019 Anita Chase, PT GP 2    [id number] HC PT THERAPEUTIC ACT EA 15 MIN 6/6/2019 Anita Chase, PT GP 1          PT G-Codes  Outcome Measure Options: AM-PAC 6 Clicks Daily Activity (OT)  AM-PAC 6 Clicks Score: 18  Score: 15  Modified [place] Scale: 3 - Moderate disability.  Requiring some help, but able to walk without assistance.    Anita Chase, PT  6/6/2019

## 2019-06-06 NOTE — PROGRESS NOTES
Case Management Discharge Note    Final Note: Pt is going home with daughter today. Pt is going to [facility] outpt PT in [place]. I gave her the order for PT and the phone number to call. The daughter wanted to arrange the outpt PT.    Destination      No service has been selected for the patient.      Durable Medical Equipment      No service has been selected for the patient.      Dialysis/Infusion      No service has been selected for the patient.      Home Medical Care      No service has been selected for the patient.      Therapy      No service has been selected for the patient.      Community Resources      No service has been selected for the patient.             Final Discharge Disposition Code: 01 - home or self-care

## 2019-06-06 NOTE — PROGRESS NOTES
"Neurology       Patient Care Team:  Dominick Soares MD as PCP - General (Medical Oncology)    Chief complaint right-sided weakness      Subjective .     History: Strength seems back to normal.  Wonders what happened.  Denies new weakness, numbness or vision changes.  No headache    ROS: No fever or chest pain    Objective     Vital Signs   Blood pressure 126/67, pulse 70, temperature 97.8 °F (36.6 °C), temperature source Oral, resp. rate 16, height 165.1 cm (65\"), weight 56.2 kg (124 lb), SpO2 93 %, not currently breastfeeding.    Physical Exam:              Neuro: Well-developed elderly white woman in no acute distress, eating lunch.  Alert, fluent and non-aphasic with no dysarthria.  Appropriate.  Right strabismus (old) otherwise EOMI, face symmetric, TML  Motor: No definite weakness on manual muscle testing    Results Review:              Follow-up head CT reviewed, agree unremarkable  Carotid ultrasound unremarkable  Echo shows PFO and positive saline with Valsalva  Labs reviewed, A1c 5.9, triglycerides 235, HDL 30, LDL 56  Creatinine 0.85    Assessment/Plan     Probable left hemisphere ischemia status post TPA with resolution of symptoms.  Now on aspirin and statin with negative follow-up head CT.  Echo shows PFO with positive saline with Valsalva.  Will get transcranial Doppler.  May need cardiology evaluation +/- [facility].     I discussed the patients findings and my recommendations with patient and family    Gaby Calvert MD  06/06/19  12:08 PM      "

## 2019-06-06 NOTE — THERAPY TREATMENT NOTE
Acute Care - Speech Language Pathology Treatment Note   [place]     Patient Name: Isabel Neil  : 1945  MRN: [id number]  Today's Date: 2019         Admit Date: 2019    Visit Dx:      ICD-10-CM ICD-9-CM   1. Acute CVA (cerebrovascular accident) (CMS/HCC) I63.9 434.91   2. Impaired functional mobility, balance, gait, and endurance Z74.09 V49.89   3. Impaired mobility and ADLs Z74.09 799.89     Patient Active Problem List   Diagnosis   • Essential hypertension   • Type 2 diabetes mellitus with complication, without long-term current use of insulin (CMS/[facility])   • Ulcerative colitis with complication (CMS/[facility])   • TIA (transient ischemic attack) vs aborted CVA   • Hyperlipidemia LDL goal <70   • PFO (patent foramen ovale)   • Blind right eye        Therapy Treatment  Rehabilitation Treatment Summary     Row Name 19 1420 19 1340          Treatment Time/Intention    Discipline  speech language pathologist  -MP  physical therapist  -LS     Document Type  therapy note (daily note)  -MP  therapy note (daily note)  -LS     Subjective Information  no complaints  -MP  no complaints  -LS     Mode of Treatment  individual therapy;speech-language pathology  -MP  physical therapy  -LS     Patient/Family Observations  S.O. present  -MP  S.O. at bedside  -LS     Care Plan Review  care plan/treatment goals reviewed;patient/other agree to care plan  -MP  --     Therapy Frequency (SLP SLC)  evaluation only;other (see comments) updated  -MP  --     Patient Effort  excellent  -MP  excellent  -LS     Existing Precautions/Restrictions  --  fall  -LS     Recorded by [MP] Sriram Jamison, MS, CFY-SLP 19 1518 [LS] Anita Chase, PT 19 1512     Row Name 19 1340             Vital Signs    Pre Systolic BP Rehab  127  -LS      Pre Treatment Diastolic BP  68  -LS      Pretreatment Heart Rate (beats/min)  86  -LS      Posttreatment Heart Rate (beats/min)  90  -LS      Pre SpO2 (%)  96  -LS      O2  Delivery Pre Treatment  room air  -LS      Post SpO2 (%)  96  -LS      O2 Delivery Post Treatment  room air  -LS      Pre Patient Position  Sitting  -LS      Intra Patient Position  Standing  -LS      Post Patient Position  Sitting  -LS      Recorded by [LS] Anita Chase, PT 06/06/19 1512      Row Name 06/06/19 1340             Cognitive Assessment/Intervention- PT/OT    Affect/Mental Status (Cognitive)  WFL  -LS      Orientation Status (Cognition)  oriented x 4  -LS      Follows Commands (Cognition)  follows one step commands;over 90% accuracy;verbal cues/prompting required  -LS      Cognitive Function (Cognitive)  safety deficit  -LS      Safety Deficit (Cognitive)  mild deficit;insight into deficits/self awareness;safety precautions awareness  -LS      Personal Safety Interventions  fall prevention program maintained;gait belt;nonskid shoes/slippers when out of bed  -LS      Recorded by [LS] Anita Chase, PT 06/06/19 1512      Row Name 06/06/19 1340             Bed Mobility Assessment/Treatment    Comment (Bed Mobility)  UIC  -LS      Recorded by [LS] Anita Chase, PT 06/06/19 1512      Row Name 06/06/19 1340             Sit-Stand Transfer    Sit-Stand [place] (Transfers)  contact guard;verbal cues  -LS      Assistive Device (Sit-Stand Transfers)  walker, front-wheeled  -LS      Recorded by [LS] Anita Chase, PT 06/06/19 1512      Row Name 06/06/19 1340             Stand-Sit Transfer    Stand-Sit [place] (Transfers)  contact guard;verbal cues  -LS      Assistive Device (Stand-Sit Transfers)  walker, front-wheeled  -LS      Recorded by [LS] Anita Chase, PT 06/06/19 1512      Row Name 06/06/19 1340             Gait/Stairs Assessment/Training    79296 - Gait Training Minutes   12  -LS      Gait/Stairs Assessment/Training  gait/ambulation assistive device  -LS      [place] Level (Gait)  contact guard;verbal cues  -LS      Assistive Device (Gait)  walker, front-wheeled  -LS      Distance in  Feet (Gait)  350  -LS      Pattern (Gait)  step-through  -LS      Deviations/Abnormal Patterns (Gait)  stride length decreased;gait speed decreased;christopher decreased  -LS      Bilateral Gait Deviations  forward flexed posture  -LS      Comment (Gait/Stairs)  VC's for upright posture and maintaining UE  on RW handles.   -LS      Recorded by [LS] Anita Chase, PT 06/06/19 1512      Row Name 06/06/19 1340             Motor Skills Assessment/Interventions    Additional Documentation  Balance (Group);Therapeutic Exercise (Group)  -LS      Recorded by [LS] Anita Chase, PT 06/06/19 1512      Row Name 06/06/19 1340             Therapeutic Exercise    Therapeutic Exercise  seated, lower extremities  -LS      Additional Documentation  Therapeutic Exercise (Row)  -LS      98302 - PT Therapeutic Exercise Minutes  3  -LS      14549 - PT Therapeutic Activity Minutes  8  -LS      Recorded by [LS] Anita Chase, PT 06/06/19 1512      Row Name 06/06/19 1340             Lower Extremity Seated Therapeutic Exercise    Performed, Seated Lower Extremity (Therapeutic Exercise)  hip flexion/extension;LAQ (long arc quad), knee extension;ankle dorsiflexion/plantarflexion  -LS      Exercise Type, Seated Lower Extremity (Therapeutic Exercise)  AROM (active range of motion)  -LS      Sets/Reps Detail, Seated Lower Extremity (Therapeutic Exercise)  1/10  -LS      Recorded by [LS] Anita Chase, PT 06/06/19 1512      Row Name 06/06/19 1340             Static Sitting Balance    Level of [place] (Unsupported Sitting, Static Balance)  supervision  -LS      Sitting Position (Unsupported Sitting, Static Balance)  sitting in chair  -LS      Recorded by [LS] Anita Chase, PT 06/06/19 1512      Row Name 06/06/19 1340             Static Standing Balance    Level of [place] (Supported Standing, Static Balance)  supervision  -LS      Assistive Device Utilized (Supported Standing, Static Balance)  walker, rolling  -LS      Recorded by  [LS] Anita Chase, PT 06/06/19 1512      Row Name 06/06/19 1340             Positioning and Restraints    Pre-Treatment Position  sitting in chair/recliner  -LS      Post Treatment Position  chair  -LS      In Chair  notified nsg;call light within reach;encouraged to call for assist;with family/caregiver;sitting  -LS      Recorded by [LS] Anita Chase, PT 06/06/19 1512      Row Name 06/06/19 1420             Pain Assessment    Additional Documentation  Pain Scale: FACES Pre/Post-Treatment (Group)  -MP      Recorded by [MP] Sriram Jamison MS, CFY-SLP 06/06/19 1518      Row Name 06/06/19 1340             Pain Scale: Numbers Pre/Post-Treatment    Pain Scale: Numbers, Pretreatment  0/10 - no pain  -LS      Pain Scale: Numbers, Post-Treatment  0/10 - no pain  -LS      Recorded by [LS] Anita Chase, PT 06/06/19 1512      Row Name 06/06/19 1420             Pain Scale: FACES Pre/Post-Treatment    Pain: FACES Scale, Pretreatment  0-->no hurt  -MP      Pain: FACES Scale, Post-Treatment  0-->no hurt  -MP      Recorded by [MP] Sriram Jamison, MS, CFY-SLP 06/06/19 1518      Row Name 06/06/19 1340             Plan of Care Review    Plan of Care Reviewed With  patient  -LS      Recorded by [LS] Anita Chase, PT 06/06/19 1512      Row Name 06/06/19 1340             Outcome Summary/Treatment Plan (PT)    Daily Summary of Progress (PT)  progress toward functional goals is good  -LS      Anticipated Discharge Disposition (PT)  home with assist;home with OP services vs HHPT pending social support  -LS      Recorded by [LS] Anita Chase, PT 06/06/19 1512      Row Name 06/06/19 1420             Outcome Summary/Treatment Plan (SLP)    Daily Summary of Progress (SLP)  progress toward functional goals is good  -MP      Plan for Continued Treatment (SLP)  Re-assessed cognitive-communication skills during tx, as pt reported back to baseline. All basic cog-comm skills apper to be WFL. No further SLP intervention indicated. Will  sign-off. Please re-consult if any further concerns.  -MP      Anticipated Dischage Disposition  unknown  -MP      Recorded by [MP] Sriram Jamison, MS, CFY-SLP 06/06/19 3878        User Key  (r) = Recorded By, (t) = Taken By, (c) = Cosigned By    Initials Name Effective Dates Discipline    LS Anita Chase, PT 06/19/15 -  PT    MP Sriram Jamison, MS, CFY-SLP 08/15/18 -  SLP          EDUCATION  The patient has been educated in the following areas:   Cognitive Impairment Communication Impairment.    SLP Recommendation and Plan  Daily Summary of Progress (SLP): progress toward functional goals is good     Plan for Continued Treatment (SLP): Re-assessed cognitive-communication skills during tx, as pt reported back to baseline. All basic cog-comm skills apper to be WFL. No further SLP intervention indicated. Will sign-off. Please re-consult if any further concerns.  Anticipated Dischage Disposition: unknown             SLP GOALS     Row Name 06/06/19 1420 06/05/19 1055          Word Retrieval Skills Goal 1 (SLP)    Improve Word Retrieval Skills By Goal 1 (SLP)  completing functional word finding tasks;80%;independently (over 90% accuracy)  -MP  completing functional word finding tasks;80%;independently (over 90% accuracy)  -     Time Frame (Word Retrieval Goal 1, SLP)  short term goal (STG);by discharge  -MP  by discharge  -CH     Progress/Outcomes (Word Retrieval Goal 1, SLP)  goal met  -MP  --        Connected Speech to Express Thoughts Goal 1 (SLP)    Improve Narrative Discourse to Express Thoughts By Goal 1 (SLP)  describing a picture;80%;independently (over 90% accuracy)  -MP  describing a picture;80%;independently (over 90% accuracy)  -     Time Frame (Connected Speech Goal 1, SLP)  short term goal (STG);by discharge  -MP  by discharge  -CH     Progress/Outcomes (Connected Speech Goal 1, SLP)  goal met  -MP  --        Additional Goal 1 (SLP)    Additional Goal 1, SLP  LTG: Patient will improve speech,  cognition and language for improved participation with her own care and rehabilitation with occ cues  -MP  LTG: Patient will improve speech, cognition and language for improved participation with her own care and rehabilitation with occ cues  -CH     Time Frame (Additional Goal 1, SLP)  by discharge  -MP  by discharge  -CH     Progress/Outcomes (Additional Goal 1, SLP)  goal met  -MP  --       User Key  (r) = Recorded By, (t) = Taken By, (c) = Cosigned By    Initials Name Provider Type    Sriram Stacy MS, MUSTAPHA-SLP Speech and Language Pathologist    Milagros Koenig MS CCC-SLP Speech and Language Pathologist              Time Calculation:     Time Calculation- SLP     Row Name 06/06/19 1521             Time Calculation- SLP    SLP Start Time  1420  -MP      SLP Received On  06/06/19  -        User Key  (r) = Recorded By, (t) = Taken By, (c) = Cosigned By    Initials Name Provider Type    Sriram Stacy MS, CFJAVIER-SLP Speech and Language Pathologist          Therapy Charges for Today     Code Description Service Date Service Provider Modifiers Qty    [id number]  ST TREATMENT SPEECH 3 6/6/2019 Sriram Jamison MS, MUSTAPHA-SLP GN 1                     Sriram Jamison MS, CFY-VALENTIN  6/6/2019

## 2019-06-06 NOTE — PLAN OF CARE
Problem: Patient Care Overview  Goal: Plan of Care Review  Outcome: Ongoing (interventions implemented as appropriate)   06/06/19 1708   Coping/Psychosocial   Plan of Care Reviewed With patient;significant other;daughter   Plan of Care Review   Progress improving   OTHER   Outcome Summary NIH 4 this morning for slight facial assymmetry noted with movement only, and deviation in gaze (apparently this is baseline - patient is blind in right eye). VSS. Patient ambulted in [place] with PT - appropriate for outpatient PT. PFO noted on ECHO - cardiology consulted. Transcranial doppler completed. Patient will need cardiac monitor at discharge - continue to monitor.        Problem: Stroke (Ischemic) (Adult)  Intervention: Monitor/Assist with Self Care   06/06/19 1600   Activity   Activity Assistance Provided assistance, 1 person     Intervention: Provide Oxygenation/Ventilation/Perfusion Support   06/05/19 1800 06/06/19 1600   Activity   Activity Management --  up in chair   Positioning   Head of Bed (HOB) --  HOB at 60-90 degrees   Respiratory Interventions   Airway/Ventilation Management airway patency maintained;calming measures promoted;pulmonary hygiene promoted --      Intervention: Promote Effective Elimination   06/05/19 1800   Genitourinary () Interventions   Urinary Elimination Promotion absorbent pad/diaper use encouraged     Intervention: Support Psychosocial Response to Stroke   06/06/19 1600   Coping/Psychosocial Interventions   Supportive Measures active listening utilized   Environmental Support calm environment promoted   Psychosocial Support   Family/Support System Care involvement promoted;presence promoted     Intervention: Manage Hypertension/Promote Hemodynamic Stability   06/06/19 1600   Safety Management   Medication Review/Management medications reviewed     Intervention: Protect Affected Joints and Extremities   06/06/19 1600   Positioning   Positioning/Transfer Devices in use;pillows   Body  Position position maintained     Intervention: Prevent/Manage DVT/VTE Risk   06/06/19 0800   Interventions   VTE Prevention/Management bilateral;sequential compression devices on     Intervention: Prevent or Minimize Pressure   06/06/19 1600   Skin Interventions   Pressure Reduction Techniques frequent weight shift encouraged   Positioning   Body Position position maintained     Intervention: Promote/Optimize Sensory/Motor Ability   06/05/19 1800   Cognitive Interventions   Sensory Stimulation Regulation auditory stimulation minimized;care clustered;lighting decreased;music/television provided for relaxation;quiet environment promoted       Goal: Signs and Symptoms of Listed Potential Problems Will be Absent, Minimized or Managed (Stroke)   06/06/19 1340   Goal/Outcome Evaluation   Problems Assessed (Stroke (Ischemic)) cognitive impairment;communication impairment;motor/sensory impairment   Problems Assessed (Stroke (Ischemic)) motor/sensory impairment

## 2019-06-06 NOTE — PLAN OF CARE
Problem: Patient Care Overview  Goal: Plan of Care Review  Outcome: Ongoing (interventions implemented as appropriate)   06/06/19 1521   Coping/Psychosocial   Plan of Care Reviewed With patient;spouse   SLP treatment completed. Re-assessed cognitive-communication skills during tx. Appear to have returned to baseline. No further SLP intervention indicated. Will sign-off. Please see note for further details and recommendations.

## 2019-06-07 VITALS
OXYGEN SATURATION: 95 % | TEMPERATURE: 98.3 F | HEART RATE: 79 BPM | SYSTOLIC BLOOD PRESSURE: 127 MMHG | DIASTOLIC BLOOD PRESSURE: 52 MMHG | HEIGHT: 65 IN | RESPIRATION RATE: 20 BRPM | WEIGHT: 124 LBS | BODY MASS INDEX: 20.66 KG/M2

## 2019-06-07 LAB
BH CV ECHO MEAS - BSA(HAYCOCK): 1.6 M^2
BH CV ECHO MEAS - BSA: 1.6 M^2
BH CV ECHO MEAS - BZI_BMI: 20.6 KILOGRAMS/M^2
BH CV ECHO MEAS - BZI_METRIC_HEIGHT: 165.1 CM
BH CV ECHO MEAS - BZI_METRIC_WEIGHT: 56.2 KG
BH CV VAS TCD LEFT DISTAL M1: 11 CM/SEC
BH CV VAS TCD LEFT MID M1: 15 CM/SEC
BH CV VAS TCD LEFT P1: 22 CM/SEC
BH CV VAS TCD LEFT P2: 12 CM/SEC
BH CV VAS TCD LEFT PROXIMAL M1: 17 CM/SEC
BH CV VAS TCD LEFT TERMINAL ICA: 19 CM/SEC
GLUCOSE BLDC GLUCOMTR-MCNC: 105 MG/DL (ref 70–130)
GLUCOSE BLDC GLUCOMTR-MCNC: 181 MG/DL (ref 70–130)
GLUCOSE BLDC GLUCOMTR-MCNC: 82 MG/DL (ref 70–130)

## 2019-06-07 PROCEDURE — 63710000001 INSULIN LISPRO (HUMAN) PER 5 UNITS: Performed by: INTERNAL MEDICINE

## 2019-06-07 PROCEDURE — 99231 SBSQ HOSP IP/OBS SF/LOW 25: CPT | Performed by: PSYCHIATRY & NEUROLOGY

## 2019-06-07 PROCEDURE — 97535 SELF CARE MNGMENT TRAINING: CPT

## 2019-06-07 PROCEDURE — 97530 THERAPEUTIC ACTIVITIES: CPT

## 2019-06-07 PROCEDURE — 99239 HOSP IP/OBS DSCHRG MGMT >30: CPT | Performed by: INTERNAL MEDICINE

## 2019-06-07 PROCEDURE — 82962 GLUCOSE BLOOD TEST: CPT

## 2019-06-07 RX ORDER — ATORVASTATIN CALCIUM 80 MG/1
80 TABLET, FILM COATED ORAL NIGHTLY
Qty: 30 TABLET | Refills: 2 | Status: SHIPPED | OUTPATIENT
Start: 2019-06-07

## 2019-06-07 RX ADMIN — INSULIN LISPRO 5 UNITS: 100 INJECTION, SOLUTION INTRAVENOUS; SUBCUTANEOUS at 12:27

## 2019-06-07 RX ADMIN — ASPIRIN 325 MG ORAL TABLET 325 MG: 325 PILL ORAL at 08:10

## 2019-06-07 RX ADMIN — INSULIN LISPRO 5 UNITS: 100 INJECTION, SOLUTION INTRAVENOUS; SUBCUTANEOUS at 08:11

## 2019-06-07 RX ADMIN — SODIUM CHLORIDE, PRESERVATIVE FREE 3 ML: 5 INJECTION INTRAVENOUS at 08:12

## 2019-06-07 RX ADMIN — SERTRALINE HYDROCHLORIDE 50 MG: 50 TABLET ORAL at 08:11

## 2019-06-07 RX ADMIN — METOPROLOL TARTRATE 50 MG: 50 TABLET ORAL at 08:10

## 2019-06-07 NOTE — PLAN OF CARE
Problem: Patient Care Overview  Goal: Plan of Care Review  Outcome: Ongoing (interventions implemented as appropriate)   06/07/19 0313   Coping/Psychosocial   Plan of Care Reviewed With patient;significant other   OTHER   Outcome Summary Pt CGA for toileting, t/f and LB dressing and SBA for mobility. Pt with no LOB during ADL tasking. Pt edcuated on the benefits of RW use for home safety. Recommend CONT skilled IPOT POC. Recommend D/C to home with assist and HH or OP services pending transport.

## 2019-06-07 NOTE — PROGRESS NOTES
"Clinical Nutrition Note      Patient Name: Isabel Neil  MRN: [id number]  Admission date: 6/4/2019      Multidisciplinary Rounds    Additional information obtained during MDR:  RN reports pt doing well . Ready for dc home today w/ OP PT, pt will need walker and holter monitor arranged.    Current diet: Diet Regular; Cardiac, Consistent Carbohydrate    Pertinent medical data reviewed  PO intake document review : 75 % average consumption of 4 meals  No nutrition risk indicators identified on nursing screen MST score \"0\"    Intervention:  Plan of care and goals reviewed    Monitor:  RD to follow per protocol      Gail Tabares MS,RD,LD  06/07/19 11:21 AM  Time: 20 min       "

## 2019-06-07 NOTE — PROGRESS NOTES
"Neurology       Patient Care Team:  Dominick Soares MD as PCP - General (Medical Oncology)    Chief complaint right-sided weakness      Subjective .     History: Weakness has resolved.  Feels ready to go home.  No current complaints.    ROS: No fever or palpitations    Objective     Vital Signs   Blood pressure 112/74, pulse 78, temperature 98.3 °F (36.8 °C), temperature source Oral, resp. rate 20, height 165.1 cm (65\"), weight 56.2 kg (124 lb), SpO2 96 %, not currently breastfeeding.    Physical Exam:              Neuro: Thin but well-developed elderly white woman in no acute distress, sitting in bedside chair, alert, fluent, with no dysarthria  EO MI with strabismus unchanged, face symmetric  Moves all extremities well, coordination intact    Results Review:              Trans-cranial Doppler pending, discussed with ultrasound lab who have forwarded it on for review.    Assessment/Plan     Probable left hemisphere ischemia, status post TPA with no residual and normal MRI-low risk for anticoagulation and agree with starting Eliquis now.  Agree with plan for mobile telemetry monitor, note higher chance of occult atrial fibrillation than paradoxical embolus from PFO.  Plan in any case is for anticoagulation.  Discussed with patient and family at bedside including risks of anticoagulation and caution with gait particularly when first rising and on uneven surfaces.  Plan on neurology follow-up in a month.  Agree with plan for discharge home.    I discussed the patients findings and my recommendations with patient, family and nursing staff    Gaby Calvert MD  06/07/19  4:07 PM      "

## 2019-06-07 NOTE — PLAN OF CARE
Problem: Patient Care Overview  Goal: Plan of Care Review  Outcome: Ongoing (interventions implemented as appropriate)   06/07/19 0665   OTHER   Outcome Summary NIH 2 at start of shift. Neuro checks intact. Plans to discharge home today. VSS, no complaints at this time. Will continue to monitor.      Goal: Individualization and Mutuality  Outcome: Ongoing (interventions implemented as appropriate)    Goal: Discharge Needs Assessment  Outcome: Ongoing (interventions implemented as appropriate)    Goal: Interprofessional Rounds/Family Conf  Outcome: Ongoing (interventions implemented as appropriate)      Problem: Fall Risk (Adult)  Goal: Identify Related Risk Factors and Signs and Symptoms  Outcome: Ongoing (interventions implemented as appropriate)    Goal: Absence of Fall  Outcome: Ongoing (interventions implemented as appropriate)      Problem: Skin Injury Risk (Adult)  Goal: Identify Related Risk Factors and Signs and Symptoms  Outcome: Ongoing (interventions implemented as appropriate)    Goal: Skin Health and Integrity  Outcome: Ongoing (interventions implemented as appropriate)      Problem: Thrombolytic Therapy (Adult)  Goal: Signs and Symptoms of Listed Potential Problems Will be Absent, Minimized or Managed (Thrombolytic Therapy)  Outcome: Ongoing (interventions implemented as appropriate)      Problem: Stroke (Ischemic) (Adult)  Goal: Signs and Symptoms of Listed Potential Problems Will be Absent, Minimized or Managed (Stroke)  Outcome: Ongoing (interventions implemented as appropriate)

## 2019-06-07 NOTE — PROGRESS NOTES
Continued Stay Note  [facility]     Patient Name: Isabel Neil  MRN: [id number]  Today's Date: 6/7/2019    Admit Date: 6/4/2019    Discharge Plan     Row Name 06/07/19 0715       Plan    Plan  Update    Plan Comments  Per Dr Wooten note, plan is discharge home when okay with neurology. CM will follow as needed.    [facility] will deliver RW today to patients room.  [facility]        Discharge Codes    No documentation.       Expected Discharge Date and Time     Expected Discharge Date Expected Discharge Time    Flo 10, 2019             [employer] RN

## 2019-06-07 NOTE — DISCHARGE SUMMARY
Discharge Summary    Patient name: Isabel Neil  CSN: [id number]  MRN: [id number]  : 1945  Today's date: 2019     Date of Admission: 2019  Date of Discharge:  2019    Admitting Physician:  Dr. Anders  Primary Care Provider: Doimnick Soares MD  Consultations:   Neurology:  Dr. Calvert  Cardiology:  Dr. Baum    Admission Diagnosis:  CVA     Discharge Diagnoses:   Active Hospital Problems    Diagnosis   • **AIS   • Hyperlipidemia LDL goal <70   • PFO (patent foramen ovale)     · Echo (2019): Saline results positive indicating PFO  · RoPE score 1 suggesting 0% chance that stroke/TIA related to PFO     • TIA (transient ischemic attack) vs aborted CVA     · CT cerebral perfusion (2019): No evidence of acute ischemia  · MRI of the brain (2019): Moderate atrophy with moderate chronic ischemic changes around the ventricles. No evidence of recent infarct  · CT of the head without contrast (2019):Chronic appearing changes of the aging brain.  No evidence of acute intracranial disease.     • Essential hypertension   • Type 2 diabetes mellitus with complication, without long-term current use of insulin (CMS/[facility])     Allergies:  Sulfa antibiotics    Code Status:  Code Status and Medical Interventions:   Ordered at: 19 7062     Code Status:    CPR     Medical Interventions (Level of Support Prior to Arrest):    Full     Procedures:  19 Bilateral Carotid Duplex - Proximal right internal carotid artery plaque without significant stenosis.  Proximal left internal carotid artery plaque without significant stenosis.  Blateral antegrade flow.    19 TTE - Left ventricular systolic function is normal. Estimated EF = 65%.  Saline test results are positive for evidence of a patent foramen ovale present.    19 Transcranial Doppler w/microbubble - A bubble study was performed on the right side bubbles noted.  A bubble study was performed on the left side bubbles noted.  "There is flow noted in the basilar artery.    History of Present Illness:  Patient is a 74 y.o. female presents with onset of right-sided weakness and difficulty speaking while standing at the sink washing dishes.  Everybody was ready to sit down at the table but she could not move from the sink because she could not move her leg. Her significant other helped her to the chair and an ambulance was called.  Onset of symptoms around 1615.  EMS reported her symptoms began to improve.  CT scan of the head, CT perfusion negative for a well-defined infarct.  She had extensive central white matter disease.  She could not raise her right leg and her right upper extremity was weak.  TPA initiated at 1759.  Patient ready feeling improved strength in her upper extremity.  Speech has cleared.    Hospital Course:  24 hours post tPA CT head revealed no hemorrhage.  He had a carotid duplex with results above.  He also had an ECHO and transcranial dopper with microbubble study with results above. Cardiology was consulted.  They did not recommend PFO closure. They felt the most likely source of her CVA was from atrial fibrillation.  They will place a 30 day event monitor at discharge.  She was placed on Eliquis and Aspirin.  Neurologically, she has improved.  She is eating and ambulating well.  It is felt that she is ready for discharge.     Vitals:  /74   Pulse 78   Temp 98.3 °F (36.8 °C) (Oral)   Resp 20   Ht 165.1 cm (65\")   Wt 56.2 kg (124 lb)   SpO2 96%   BMI 20.63 kg/m²     Physical Exam:  Constitutional:  Appears well-developed and well-nourished. No distress.   HEENT:  Normocephalic and atraumatic. PERRL  Neck:  Neck supple. No JVD present.   CV: Normal rate, regular rhythm,  intact distal pulses.  No gallop, murmur or rub.  Pulmonary/Chest: Effort normal and breath sounds normal. No respiratory distress. No wheezes, rhonchi or rales.   Abdominal: Soft. +BS. No distension and no mass. There is no tenderness. "   Musculoskeletal: Normal muscle tone and strength  Neurological: Alert and oriented to person, place, and time.    Skin: Skin is warm and dry. No rash noted.   Extremities:  No clubbing, edema or cyanosis  Psychiatric: Normal mood and affect. Behavior is normal.     Interval: (shift change)  1a. Level of Consciousness: 0-->Alert, keenly responsive  1b. LOC Questions: 0-->Answers both questions correctly  1c. LOC Commands: 0-->Performs both tasks correctly  2. Best Gaze: 0-->Normal  3. Visual: 2-->Complete hemianopia(R eye blindness baseline per patient)  4. Facial Palsy: 0-->Normal symmetrical movements  5a. Motor Arm, Left: 0-->No drift, limb holds 90 (or 45) degrees for full 10 secs  5b. Motor Arm, Right: 0-->No drift, limb holds 90 (or 45) degrees for full 10 secs  6a. Motor Leg, Left: 0-->No drift, leg holds 30 degree position for full 5 secs  6b. Motor Leg, Right: 0-->No drift, leg holds 30 degree position for full 5 secs  7. Limb Ataxia: 0-->Absent  8. Sensory: 0-->Normal, no sensory loss  9. Best Language: 0-->No aphasia, normal  10. Dysarthria: 0-->Normal  11. Extinction and Inattention (formerly Neglect): 0-->No abnormality    Total (NIH Stroke Scale): 2    Labs:  Results from last 7 days   Lab Units 06/06/19  0425   WBC 10*3/mm3 8.17   HEMOGLOBIN g/dL 12.8   HEMATOCRIT % 39.4   PLATELETS 10*3/mm3 278     Results from last 7 days   Lab Units 06/06/19  0425 06/05/19  0419   SODIUM mmol/L 142 142   POTASSIUM mmol/L 3.8 4.0   CHLORIDE mmol/L 104 104   CO2 mmol/L 25.0 25.0   BUN mg/dL 18 18   CREATININE mg/dL 0.85 0.92   CALCIUM mg/dL 10.0 9.6   BILIRUBIN mg/dL  --  0.4   ALK PHOS U/L  --  104   ALT (SGPT) U/L  --  5   AST (SGOT) U/L  --  16   GLUCOSE mg/dL 112* 130*         No results found for: MG, PHOS              Discharge Medications      New Medications      Instructions Start Date   apixaban 5 MG tablet tablet  Commonly known as:  ELIQUIS   5 mg, Oral, Every 12 Hours Scheduled      aspirin 81 MG  tablet   81 mg, Oral, Daily      atorvastatin 80 MG tablet  Commonly known as:  LIPITOR   80 mg, Oral, Nightly         Continue These Medications      Instructions Start Date   albuterol sulfate  (90 Base) MCG/ACT inhaler  Commonly known as:  PROVENTIL HFA;VENTOLIN HFA;PROAIR HFA   2 puffs, Inhalation, Every 4 Hours PRN      calcium carbonate 600 MG tablet  Commonly known as:  OS-VU   1,200 mg, Oral, Daily      diazePAM 5 MG tablet  Commonly known as:  VALIUM   5 mg, Oral, Nightly PRN      hyoscyamine sulfate 0.125 MG tablet dispersible disintegrating tablet  Commonly known as:  ANASPAZ   125 mcg, Oral, Every 4 Hours PRN      memantine 10 MG tablet  Commonly known as:  NAMENDA   10 mg, Oral, 2 Times Daily      metFORMIN 1000 MG tablet  Commonly known as:  GLUCOPHAGE   1,000 mg, Oral, 2 Times Daily With Meals      metoprolol tartrate 50 MG tablet  Commonly known as:  LOPRESSOR   50 mg, Oral, 2 Times Daily      nystatin 219883 UNIT/ML suspension  Commonly known as:  MYCOSTATIN   500,000 Units, Swish & Swallow, As Needed      omeprazole-sodium bicarbonate  MG per capsule  Commonly known as:  ZEGERID   1 capsule, Oral, Daily      sertraline 50 MG tablet  Commonly known as:  ZOLOFT   50 mg, Oral, Daily      timolol 0.5 % ophthalmic solution  Commonly known as:  TIMOPTIC   1 drop, Both Eyes, Nightly         Stop These Medications    colestipol 1 g tablet  Commonly known as:  COLESTID     gemfibrozil 600 MG tablet  Commonly known as:  LOPID          Discharge Diet:   Diet Instructions     Diet: Regular, Consistent Carbohydrate, Cardiac; Thin      Discharge Diet:   Regular  Consistent Carbohydrate  Cardiac       Fluid Consistency:  Thin        Activity at Discharge:    Activity Instructions     Activity as Tolerated          Follow-up Appointments  Future Appointments   Date Time Provider Department Center   7/18/2019  2:00 PM Carlene Gomes APRN [facility] [place] None   7/30/2019  1:00 PM Jax  BALBINA Singh [facility] [place] None     Additional Instructions for the Follow-ups that You Need to Schedule     Discharge Follow-up with PCP   As directed       Currently Documented PCP:    Dominick Soares MD    PCP Phone Number:    [phone]     Follow Up Details:  one week         Discharge Follow-up with Specified Provider: MICHAEL Taylor with Cardiology; 6 Weeks   As directed      To:  MICHAEL Taylor with Cardiology    Follow Up:  6 Weeks         Discharge Follow-up with Specified Provider: Neurology Clinic; 1 Month   As directed      To:  Neurology Clinic    Follow Up:  1 Month             Discharge Instructions:  Ok to go home  Follow up as above  Scripts sent electronically  Aspirin, Plavix, Lipitor  To get 30 day outpatient monitor before discharge     BALBINA Redd, AGACNP-BC, FNP-BC  Pulmonary & Critical Care Medicine    Time: Discharge 45 min    CC: Dominick Soares MD

## 2019-06-07 NOTE — PROGRESS NOTES
"  INTENSIVIST   PROGRESS NOTE     Hospital:  LOS: 3 days      S     Ms. Isabel Neil, 74 y.o. female is followed for:    AIS    Type 2 diabetes mellitus with complication, without long-term current use of insulin (CMS/[facility])    Ulcerative colitis with complication (CMS/[facility])    Acute CVA (cerebrovascular accident) (CMS/[facility])    As an Intensivist, we provide an integrated approach to the ICU patient and family, medical management of comorbid conditions, lead interdisciplinary rounds and coordinate the care with all other services, including those from other specialists.     Interval History:  Uneventful night.  Anxious to go home.  Hard to sleep with all the \"beeps\" in the ICU.     The patient's relevant past medical, surgical and social history were reviewed and updated in Epic as appropriate.     ROS:   Constitutional: Negative for fever.   Respiratory: Negative for dyspnea.   Cardiovascular: Negative for chest pain.   Gastrointestinal: Negative for  nausea, vomiting and diarrhea.        O     Vitals:  Temp: 98.3 °F (36.8 °C) (06/07/19 0800) Temp  Min: 98 °F (36.7 °C)  Max: 99.1 °F (37.3 °C)   BP: 137/59 (06/07/19 1100) BP  Min: 108/57  Max: 151/75   Pulse: 64 (06/07/19 1100) Pulse  Min: 64  Max: 112   Resp: 16 (06/07/19 0800) Resp  Min: 16  Max: 20   SpO2: 95 % (06/07/19 1100) SpO2  Min: 92 %  Max: 97 %   Device: room air (06/07/19 1000)    Flow Rate: 2 (06/06/19 0800) No Data Recorded     Physical Examination  Telemetry:  Rhythm: normal sinus rhythm (06/07/19 1000)         Constitutional:  No acute distress.  Thin.   Cardiovascular: Normal rate, regular and rhythm. Normal heart sounds.  No murmurs, gallop or rub.   Respiratory: No respiratory distress.  Normal breath sounds  No adventitious sounds    Abdominal:  Soft with no tenderness  No distension. No HSM.   Extremities: Warm with no cyanosis.  No peripheral edema.   Neurological:   Alert and Oriented to person, place, and time.  Best Eye Response: 4-->(E4) " spontaneous (06/07/19 1000)  Best Motor Response: 6-->(M6) obeys commands (06/07/19 1000)  Best Verbal Response: 5-->(V5) oriented (06/07/19 1000)  Lynnette Coma Scale Score: 15 (06/07/19 1000)   Lines/Drains/Airways: Peripheral IV(s)     NIH Stroke Scale  Interval: (shift change) (06/07/19 0700)  1a. Level of Consciousness: 0-->Alert, keenly responsive (06/07/19 0700)  1b. LOC Questions: 0-->Answers both questions correctly (06/07/19 0700)  1c. LOC Commands: 0-->Performs both tasks correctly (06/07/19 0700)  2. Best Gaze: 0-->Normal (06/07/19 0700)  3. Visual: 2-->Complete hemianopia(R eye blindness baseline per patient) (06/07/19 0700)  4. Facial Palsy: 0-->Normal symmetrical movements (06/07/19 0700)  5a. Motor Arm, Left: 0-->No drift, limb holds 90 (or 45) degrees for full 10 secs (06/07/19 0700)  5b. Motor Arm, Right: 0-->No drift, limb holds 90 (or 45) degrees for full 10 secs (06/07/19 0700)  6a. Motor Leg, Left: 0-->No drift, leg holds 30 degree position for full 5 secs (06/07/19 0700)  6b. Motor Leg, Right: 0-->No drift, leg holds 30 degree position for full 5 secs (06/07/19 0700)  7. Limb Ataxia: 0-->Absent (06/07/19 0700)  8. Sensory: 0-->Normal, no sensory loss (06/07/19 0700)  9. Best Language: 0-->No aphasia, normal (06/07/19 0700)  10. Dysarthria: 0-->Normal (06/07/19 0700)  11. Extinction and Inattention (formerly Neglect): 0-->No abnormality (06/07/19 0700)  Total (NIH Stroke Scale): 2 (06/07/19 0700)    Hematology:  Results from last 7 days   Lab Units 06/06/19  0425 06/05/19 0419 06/04/19  1739   WBC 10*3/mm3 8.17 8.32 8.23   HEMOGLOBIN g/dL 12.8 12.0 13.0   MCV fL 88.7 90.0 89.1   PLATELETS 10*3/mm3 278 265 300       Chemistry:  Estimated Creatinine Clearance: 50 mL/min (by C-G formula based on SCr of 0.85 mg/dL).    Results from last 7 days   Lab Units 06/06/19 0425 06/05/19  0419   SODIUM mmol/L 142 142   POTASSIUM mmol/L 3.8 4.0   CHLORIDE mmol/L 104 104   CO2 mmol/L 25.0 25.0   ANION GAP  mmol/L 13.0 13.0   GLUCOSE mg/dL 112* 130*   CALCIUM mg/dL 10.0 9.6     Results from last 7 days   Lab Units 06/06/19  0425 06/05/19  0419 06/04/19  1736   BUN mg/dL 18 18  --    CREATININE mg/dL 0.85 0.92 1.30     Images:  Ct Head Without Contrast    Result Date: 6/5/2019  Chronic appearing changes of the aging brain, including white matter disease similar to previous brain MRI.. No evidence of acute intracranial disease.   This report was finalized on 6/5/2019 6:30 PM by DR. Giovani Blanton MD.      Echo:  Results for orders placed during the hospital encounter of 06/04/19   Adult Transthoracic Echo Complete W/ Cont if Necessary Per Protocol (With Agitated Saline)    Narrative · Left ventricular systolic function is normal. Estimated EF = 65%.  · Saline test results are positive for evidence of a patent foramen ovale   present.          Results: Reviewed.  I reviewed the patient's new laboratory and imaging results.  I independently reviewed the patient's new images.    Medications: Reviewed.    Assessment/Plan   A / P     Assessment:    74 y.o.female, admitted on 6/4/2019 with Acute CVA (cerebrovascular accident) (CMS/[facility]) [I63.9]:     1. Probable left hemispheric AIS vs TIA (Right sided weakness)  1. S/p tPA  2. ECHO (+) saline test. Probable PFO  2. Ulcerative colitis  3. Dyslipidemia  4. Blindness O.D.  5. Anxiety / Chronic   6. T2DM on Metformin at home.      Results from last 7 days   Lab Units 06/07/19  1118 06/07/19  0701 06/06/19 2017 06/06/19  1634 06/06/19  1125 06/06/19  0717   GLUCOSE mg/dL 82 105 188* 128 117 114     Lab Results   Lab Value Date/Time    HGBA1C 5.90 (H) 06/05/2019 0419     Diet: Diet Regular; Cardiac, Consistent Carbohydrate   Advance Directives: Code Status and Medical Interventions:   Ordered at: 06/04/19 1854     Code Status:    CPR     Medical Interventions (Level of Support Prior to Arrest):    Full        Plan:    1. No plans for PFO closure as per Cardiology  2. TCD: Report:  pending  3. Anticoagulation as per Cardiology, for crytogenic A Fib, when OK with Neurology.   4. Goal: Glucose < 180 mg/dL.  1. Insulin basal, prandial and correction while in the Hospital. Resume Metformin at home.  5. Disposition: Discharge Home.} Probable today after Neurology sees her and have TCD results.  1. F/U with Cardiology in 6 weeks. (Event monitor or similar).  2. Outpatient PT at [place].    Plan of care and goals reviewed during interdisciplinary rounds.  I discussed the patient's findings and my recommendations with patient    Level of Risk is High due to:  illness with threat to life or bodily function     Christo Wooten MD, FACP, FCCP, CNSC  Intensive Care Medicine, Nutrition Support and Pulmonary Medicine     [x]  Primary Attending  []  Consultant

## 2019-06-07 NOTE — THERAPY TREATMENT NOTE
Acute Care - Occupational Therapy Treatment Note  [facility]     Patient Name: Isabel Neil  : 1945  MRN: [id number]  Today's Date: 2019  Onset of Illness/Injury or Date of Surgery: 19  Date of Referral to OT: 19  Referring Physician: Maria Elena MORTENSEN    Admit Date: 2019       ICD-10-CM ICD-9-CM   1. Acute CVA (cerebrovascular accident) (CMS/[facility]) I63.9 434.91   2. Impaired functional mobility, balance, gait, and endurance Z74.09 V49.89   3. Impaired mobility and ADLs Z74.09 799.89     Patient Active Problem List   Diagnosis   • AIS   • Essential hypertension   • Type 2 diabetes mellitus with complication, without long-term current use of insulin (CMS/[facility])   • Ulcerative colitis with complication (CMS/[facility])   • TIA (transient ischemic attack) vs aborted CVA   • Hyperlipidemia LDL goal <70   • PFO (patent foramen ovale)   • Blind right eye     Past Medical History:   Diagnosis Date   • Anxiety    • Blindness of right eye     Post trauma   • Colon polyp    • COPD (chronic obstructive pulmonary disease) (CMS/[facility])    • CVA (cerebral vascular accident) (CMS/[facility]) 2019   • Dementia    • Depression    • Diabetes mellitus (CMS/[facility])    • Dyslipidemia    • History of irregular heartbeat    • Ulcerative colitis (CMS/[facility])      Past Surgical History:   Procedure Laterality Date   • COLONOSCOPY W/ POLYPECTOMY     • HYSTERECTOMY         Therapy Treatment    Rehabilitation Treatment Summary     Row Name 19 0915             Treatment Time/Intention    Discipline  occupational therapist  (Pended)   -CH      Document Type  therapy note (daily note)  (Pended)   -      Subjective Information  no complaints  (Pended)   -CH      Mode of Treatment  occupational therapy  (Pended)   -CH      Patient Effort  good  (Pended)   -      Existing Precautions/Restrictions  fall  (Pended)   -      Recorded by [CH] Ravi Nickerson, OT Student 19 1015      Row Name 19 0915             Vital  Signs    Pre Systolic BP Rehab  --  (Pended)  [place] [facility] [place]   -CH      Post Systolic BP Rehab  124  (Pended)   -CH      Post Treatment Diastolic BP  86  (Pended)   -      Posttreatment Heart Rate (beats/min)  67  (Pended)   -CH      Post SpO2 (%)  94  (Pended)   -      O2 Delivery Post Treatment  room air  (Pended)   -CH      Pre Patient Position  Sitting  (Pended)   -CH      Intra Patient Position  Standing  (Pended)   -CH      Post Patient Position  Sitting  (Pended)   -      Recorded by [CH] Ravi Nickerson, OT Student 06/07/19 1015      Row Name 06/07/19 0915             Cognitive Assessment/Intervention    Additional Documentation  Cognitive Assessment/Intervention (Group)  (Pended)   -      Recorded by [] Ravi Nickerson OT Student 06/07/19 1015      Row Name 06/07/19 0915             Cognitive Assessment/Intervention- PT/OT    Affect/Mental Status (Cognitive)  WFL  (Pended)   -      Orientation Status (Cognition)  oriented x 4  (Pended)   -      Follows Commands (Cognition)  follows one step commands;over 90% accuracy  (Pended)   -      Cognitive Function (Cognitive)  safety deficit  (Pended)   -      Safety Deficit (Cognitive)  awareness of need for assistance;safety precautions awareness;mild deficit  (Pended)   -      Personal Safety Interventions  fall prevention program maintained;gait belt;nonskid shoes/slippers when out of bed  (Pended)   -      Recorded by [CH] Ravi Nickerson OT Student 06/07/19 1023      Row Name 06/07/19 0915             Safety Issues, Functional Mobility    Safety Issues Affecting Function (Mobility)  awareness of need for assistance;safety precaution awareness  (Pended)   -      Impairments Affecting Function (Mobility)  balance;strength;endurance/activity tolerance  (Pended)   -      Recorded by [] Ravi Nickerson OT Student 06/07/19 1023      Row Name 06/07/19 0915             Bed Mobility  Assessment/Treatment    Comment (Bed Mobility)  [place] [facility] [place]  (Pended)   -CH      Recorded by [CH] Ravi Nickerson, OT Student 06/07/19 1023      Row Name 06/07/19 0915             Functional Mobility    Functional Mobility- Ind. Level  standby assist  (Pended)   -CH      Functional Mobility- Device  rolling walker  (Pended)   -      Functional Mobility-Distance (Feet)  300  (Pended)   -CH      Functional Mobility- Comment  Pt with no LOB during mobility. Pt educated on the benfits of  for ambulation at home and agreed to use one once home.   (Pended)   -CH      Recorded by [CH] Ravi Nickerson, OT Student 06/07/19 1023      Row Name 06/07/19 0915             Transfer Assessment/Treatment    Transfer Assessment/Treatment  sit-stand transfer;stand-sit transfer;toilet transfer  (Pended)   -      Comment (Transfers)  [place] [facility] able to stand to manage undergarments with no LOB.  (Pended)   -CH2      Recorded by [CH] Ravi Nickerson, OT Student 06/07/19 1023  [CH2] Ravi Nickerson, OT Student 06/07/19 1026      Row Name 06/07/19 0915             Sit-Stand Transfer    Sit-Stand [place] (Transfers)  contact guard;verbal cues  (Pended)   -      Assistive Device (Sit-Stand Transfers)  walker, front-wheeled  (Pended)   -CH      Recorded by [CH] Ravi Nickerson, OT Student 06/07/19 1026      Row Name 06/07/19 0915             Stand-Sit Transfer    Stand-Sit [place] (Transfers)  contact guard;verbal cues  (Pended)   -      Assistive Device (Stand-Sit Transfers)  walker, front-wheeled  (Pended)   -CH      Recorded by [CH] Ravi Nickerson, OT Student 06/07/19 1026      Row Name 06/07/19 0915             Toilet Transfer    Type (Toilet Transfer)  sit-stand  (Pended)   -      [place] Level (Toilet Transfer)  contact guard;verbal cues  (Pended)   -      Assistive Device (Toilet Transfer)  --  (Pended)  UE HHA  -       Recorded by [CH] Ravi Nickerson, OT Student 06/07/19 1026      Row Name 06/07/19 0915             ADL Assessment/Intervention    27734 - OT Self Care/Mgmt Minutes  16  (Pended)   -CH      BADL Assessment/Intervention  upper body dressing;lower body dressing;toileting  (Pended)   -CH      Recorded by [CH] Ravi Nickerson, OT Student 06/07/19 1026      Row Name 06/07/19 0915             Upper Body Dressing Assessment/Training    Upper Body Dressing [place] Level  doff;don;front opening garment;minimum assist (75% patient effort)  (Pended)   -CH      Upper Body Dressing Position  supported sitting  (Pended)   -CH      Comment (Upper Body Dressing)  Min A for line management.  (Pended)   -CH      Recorded by [CH] Ravi Nickerson, OT Student 06/07/19 1030      Row Name 06/07/19 0915             Lower Body Dressing Assessment/Training    Lower Body Dressing [place] Level  doff;don;socks;contact guard assist  (Pended)   -CH      Lower Body Dressing Position  supported sitting  (Pended)   -CH2      Comment (Lower Body Dressing)  Pt able to cross legs to complete LB dressing task with no LOB.  (Pended)   -CH2      Recorded by [CH] Ravi Nickerson, OT Student 06/07/19 1041  [CH2] Ravi Nickerson, OT Student 06/07/19 1030      Row Name 06/07/19 0915             Toileting Assessment/Training    [place] Level (Toileting)  adjust/manage clothing;contact guard assist;set up;perform perineal hygiene  (Pended)   -CH      Assistive Devices (Toileting)  commode, bedside without drop arms  (Pended)   -CH      Toileting Position  supported sitting;supported standing  (Pended)   -CH      Comment (Toileting)  Pt able to complete with no LOB and set up for toileting materials.   (Pended)   -CH      Recorded by [CH] Ravi Nickerson, OT Student 06/07/19 1030      Row Name 06/07/19 0915             BADL Safety/Performance    Impairments, BADL Safety/Performance  endurance/activity  tolerance  (Pended)   -CH      Skilled BADL Treatment/Intervention  BADL process/adaptation training  (Pended)   -CH      Recorded by [CH] Ravi Nickerson, OT Student 06/07/19 1030      Row Name 06/07/19 0915             Therapeutic Exercise    87541 - OT Therapeutic Activity Minutes  14  (Pended)   -CH      Recorded by [CH] Ravi Nickerson OT Student 06/07/19 1039      Row Name 06/07/19 0915             Balance    Balance  static sitting balance;dynamic sitting balance;static standing balance;dynamic standing balance  (Pended)   -CH      Recorded by [CH] Ravi Nickerson, OT Student 06/07/19 1032      Row Name 06/07/19 0915             Static Sitting Balance    Level of [place] (Unsupported Sitting, Static Balance)  supervision  (Pended)   -CH      Sitting Position (Unsupported Sitting, Static Balance)  sitting in chair  (Pended)   -CH      Time Able to Maintain Position (Unsupported Sitting, Static Balance)  more than 5 minutes  (Pended)   -CH      Comment (Unsupported Sitting, Static Balance)  Pt with no lean or LOB.  (Pended)   -CH      Level of [place] (Supported Sitting, Static Balance)  independent  (Pended)   -CH      Sitting Position (Supported Sitting, Static Balance)  sitting in chair  (Pended)   -CH      Time Able to Maintain Position (Supported Sitting, Static Balance)  more than 5 minutes  (Pended)   -CH      Recorded by [CH] Ravi Nickerson, OT Student 06/07/19 1032      Row Name 06/07/19 0915             Dynamic Sitting Balance    Level of [place], Reaches Outside Midline (Sitting, Dynamic Balance)  contact guard assist  (Pended)   -CH      Sitting Position, Reaches Outside Midline (Sitting, Dynamic Balance)  sitting in chair  (Pended)   -CH      Comment, Reaches Outside Midline (Sitting, Dynamic Balance)  Pt with no LOB during ADL tasking.   (Pended)   -CH      Recorded by [CH] Ravi Nickerson, OT Student 06/07/19 1032      Row Name 06/07/19 0915              Static Standing Balance    Level of [place] (Supported Standing, Static Balance)  supervision  (Pended)   -CH      Time Able to Maintain Position (Supported Standing, Static Balance)  2 to 3 minutes  (Pended)   -CH      Assistive Device Utilized (Supported Standing, Static Balance)  walker, rolling  (Pended)   -[facility]      Recorded by [CH] Ravi Nickerson, OT Student 06/07/19 1032  [CH2] Ravi Nickerson, OT Student 06/07/19 1034      Row Name 06/07/19 0915             Dynamic Standing Balance    Level of [place], Reaches Outside Midline (Standing, Dynamic Balance)  contact guard assist  (Pended)   -CH      Time Able to Maintain Position, Reaches Outside Midline (Standing, Dynamic Balance)  1 to 2 minutes  (Pended)   -CH      Assistive Device Utilized (Supported Standing, Dynamic Balance)  walker, rolling  (Pended)   -CH      Comment, Reaches Outside Midline (Standing, Dynamic Balance)  Pt with no LOB during toileting.  (Pended)   -CH      Recorded by [CH] Ravi Nickerson, OT Student 06/07/19 1034      Row Name 06/07/19 0915             Positioning and Restraints    Pre-Treatment Position  bedside commode  (Pended)  With NSG  -CH      Post Treatment Position  chair  (Pended)   -CH      In Chair  notified nsg;reclined;call light within reach;encouraged to call for assist;exit alarm on;with family/caregiver;waffle cushion  (Pended)   -      Recorded by [CH] Ravi Nickerson, OT Student 06/07/19 1034      Row Name 06/07/19 0915             Pain Scale: Numbers Pre/Post-Treatment    Pain Scale: Numbers, Pretreatment  0/10 - no pain  (Pended)   -      Pain Scale: Numbers, Post-Treatment  0/10 - no pain  (Pended)   -      Recorded by [CH] Ravi Nickerson, OT Student 06/07/19 1034      Row Name 06/07/19 0915             Plan of Care Review    Plan of Care Reviewed With  patient;significant other  (Pended)   -      Recorded by [CH] Ravi Nickerson, OT Student  06/07/19 1034      Row Name 06/07/19 0915             Outcome Summary/Treatment Plan (OT)    Daily Summary of Progress (OT)  progress toward functional goals is good  (Pended)   -      Anticipated Equipment Needs at Discharge (OT)  front wheeled walker  (Pended)   -CH      Anticipated Discharge Disposition (OT)  home with assist;home with home health  (Pended)  HH vs OP pending transport.   -      Recorded by [] Ravi Nickerson OT Student 06/07/19 1034        User Key  (r) = Recorded By, (t) = Taken By, (c) = Cosigned By    Initials Name Effective Dates Discipline    Ravi Valladares OT Student 03/13/19 -  OT           Rehab Goal Summary     Row Name 06/07/19 0915             Transfer Goal 1 (OT)    Progress/Outcome (Transfer Goal 1, OT)  goal partially met  (Pended)   -CH         Dressing Goal 1 (OT)    Progress/Outcome (Dressing Goal 1, OT)  goal met  (Pended)   -         Toileting Goal 1 (OT)    Progress/Outcome (Toileting Goal 1, OT)  goal ongoing  (Pended)   -        User Key  (r) = Recorded By, (t) = Taken By, (c) = Cosigned By    Initials Name Provider Type Discipline    Ravi Valladares OT Student OT Student OT        Occupational Therapy Education     Title: PT OT SLP Therapies (In Progress)     Topic: Occupational Therapy (In Progress)     Point: ADL training (Done)     Description: Instruct learner(s) on proper safety adaptation and remediation techniques during self care or transfers.   Instruct in proper use of assistive devices.    Learning Progress Summary           Patient Acceptance, E, VU by  at 6/7/2019  9:15 AM    Comment:  Pt educated on safe t/f techniques, the benefits of a walker, home safety and the role of OT.   Significant Other Acceptance, E, VU by  at 6/7/2019  9:15 AM    Comment:  Pt educated on safe t/f techniques, the benefits of a walker, home safety and the role of OT.                   Point: Precautions (Done)     Description: Instruct  learner(s) on prescribed precautions during self-care and functional transfers.    Learning Progress Summary           Patient Acceptance, E, VU by  at 6/7/2019  9:15 AM    Comment:  Pt educated on safe t/f techniques, the benefits of a walker, home safety and the role of OT.    Acceptance, E, VU by  at 6/5/2019  7:58 AM    Comment:  PT educated on the benefits of therapy and the role of OT.   Family Acceptance, E, VU by  at 6/5/2019  7:58 AM    Comment:  PT educated on the benefits of therapy and the role of OT.   Significant Other Acceptance, E, VU by  at 6/7/2019  9:15 AM    Comment:  Pt educated on safe t/f techniques, the benefits of a walker, home safety and the role of OT.                   Point: Body mechanics (Done)     Description: Instruct learner(s) on proper positioning and spine alignment during self-care, functional mobility activities and/or exercises.    Learning Progress Summary           Patient Acceptance, E, VU by  at 6/7/2019  9:15 AM    Comment:  Pt educated on safe t/f techniques, the benefits of a walker, home safety and the role of OT.    Acceptance, E, VU by  at 6/5/2019  7:58 AM    Comment:  PT educated on the benefits of therapy and the role of OT.   Family Acceptance, E, VU by  at 6/5/2019  7:58 AM    Comment:  PT educated on the benefits of therapy and the role of OT.   Significant Other Acceptance, E, VU by  at 6/7/2019  9:15 AM    Comment:  Pt educated on safe t/f techniques, the benefits of a walker, home safety and the role of OT.                               User Key     Initials Effective Dates Name Provider Type Discipline     03/13/19 -  Ravi Nickerson OT Student OT Student OT                OT Recommendation and Plan  Outcome Summary/Treatment Plan (OT)  Daily Summary of Progress (OT): (P) progress toward functional goals is good  Anticipated Equipment Needs at Discharge (OT): (P) front wheeled walker  Anticipated Discharge Disposition (OT): (P)  home with assist, home with home health(HH vs OP pending transport. )  Daily Summary of Progress (OT): (P) progress toward functional goals is good  Plan of Care Review  Plan of Care Reviewed With: (P) patient, significant other  Plan of Care Reviewed With: (P) patient, significant other  Outcome Summary: (P) Pt CGA for toileting, t/f and LB dressing and SBA for mobility. Pt with no LOB during ADL tasking. Pt edcuated on the benefits of RW use for home safety. Recommend CONT skilled IPOT POC. Recommend D/C to home with assist and HH or OP services pending transport.   Outcome Measures     Row Name 06/07/19 0915 06/06/19 1340 06/05/19 0820       How much help from another person do you currently need...    Turning from your back to your side while in flat bed without using bedrails?  --  3  -LS  3  -LS (r) NW (t) LS (c)    Moving from lying on back to sitting on the side of a flat bed without bedrails?  --  3  -LS  3  -LS (r) NW (t) LS (c)    Moving to and from a bed to a chair (including a wheelchair)?  --  3  -LS  3  -LS (r) NW (t) LS (c)    Standing up from a chair using your arms (e.g., wheelchair, bedside chair)?  --  3  -LS  3  -LS (r) NW (t) LS (c)    Climbing 3-5 steps with a railing?  --  3  -LS  3  -LS (r) NW (t) LS (c)    To walk in hospital room?  --  3  -LS  3  -LS (r) NW (t) LS (c)    AM-PAC 6 Clicks Score  --  18  -LS  18  -LS (r) NW (t)       How much help from another is currently needed...    Putting on and taking off regular lower body clothing?  4  (Pended)   -CH  --  --    Bathing (including washing, rinsing, and drying)  3  (Pended)   -CH  --  --    Toileting (which includes using toilet bed pan or urinal)  3  (Pended)   -CH  --  --    Putting on and taking off regular upper body clothing  3  (Pended)   -CH  --  --    Taking care of personal grooming (such as brushing teeth)  4  (Pended)   -CH  --  --    Eating meals  3  (Pended)   -CH  --  --    Score  20  (Pended)   -CL (r) CH (t)  --  --        Modified [place] Scale    Pre-Stroke Modified [place] Scale  0 - No Symptoms at all.  (Pended)   -CH  --  0 - No Symptoms at all.  -LS (r) NW (t) LS (c)    Modified [place] Scale  2 - Slight disability.  Unable to carry out all previous activities but able to look after own affairs without assistance.  (Pended)   -CH  --  3 - Moderate disability.  Requiring some help, but able to walk without assistance.  -LS (r) NW (t) LS (c)       Functional Assessment    Outcome Measure Options  --  AM-PAC 6 Clicks Daily Activity (OT)  -LS  AM-PAC 6 Clicks Basic Mobility (PT)  -LS (r) NW (t) LS (c)    Row Name 06/05/19 0758             How much help from another is currently needed...    Putting on and taking off regular lower body clothing?  2  -LS (r) CH (t) LS (c)      Bathing (including washing, rinsing, and drying)  2  -LS (r) CH (t) LS (c)      Toileting (which includes using toilet bed pan or urinal)  2  -LS (r) CH (t) LS (c)      Putting on and taking off regular upper body clothing  3  -LS (r) CH (t) LS (c)      Taking care of personal grooming (such as brushing teeth)  3  -LS (r) CH (t) LS (c)      Eating meals  3  -LS (r) CH (t) LS (c)      Score  15  -LS (r) CH (t)         Modified [place] Scale    Pre-Stroke Modified [place] Scale  0 - No Symptoms at all.  -LS (r) CH (t) LS (c)      Modified [place] Scale  3 - Moderate disability.  Requiring some help, but able to walk without assistance.  -LS (r) CH (t) LS (c)         Functional Assessment    Outcome Measure Options  AM-PAC 6 Clicks Daily Activity (OT);Modified [place]  -LS (r) CH (t) LS (c)        User Key  (r) = Recorded By, (t) = Taken By, (c) = Cosigned By    Initials Name Provider Type    Anita Castro, PT Physical Therapist    NW Roland Vasquez, PT Student PT Student    Otilia Sanders, OT Occupational Therapist    CH Ravi Nickerson, OT Student OT Student           Time Calculation:   Time Calculation- OT     Row Name 06/07/19 0915             Time  Calculation- OT    OT Start Time  0915  (Pended)   -      Total Timed Code Minutes- OT  30 minute(s)  (Pended)   -      OT Received On  06/07/19  (Pended)   -      OT Goal Re-Cert Due Date  06/15/19  (Pended)   -         Timed Charges    25513 - OT Therapeutic Activity Minutes  14  (Pended)   -      65985 - OT Self Care/Mgmt Minutes  16  (Pended)   -        User Key  (r) = Recorded By, (t) = Taken By, (c) = Cosigned By    Initials Name Provider Type     Ravi Nickerson, OT Student OT Student        Therapy Charges for Today     Code Description Service Date Service Provider Modifiers Qty    [id number]  OT THERAPEUTIC ACT EA 15 MIN 6/7/2019 Ravi Nickerson OT Student GO 1    [id number]  OT SELF CARE/MGMT/TRAIN EA 15 MIN 6/7/2019 Ravi Nickerson OT Student GO 1               MADISON Ovalle  6/7/2019

## 2019-06-08 ENCOUNTER — READMISSION MANAGEMENT (OUTPATIENT)
Dept: CALL CENTER | Facility: HOSPITAL | Age: 74
End: 2019-06-08

## 2019-06-08 NOTE — OUTREACH NOTE
Prep Survey      Responses   Facility patient discharged from?  [facility]   Is patient eligible?  Yes   Discharge diagnosis  CVA, Acute,Ischemic Stroke, HLD, PFO, TIA, essential HTN,NIDDM II    Does the patient have one of the following disease processes/diagnoses(primary or secondary)?  Stroke (TIA)   Does the patient have Home health ordered?  No   Is there a DME ordered?  Yes   What DME was ordered?  Walker   Comments regarding appointments  See AVS   Prep survey completed?  Yes          Isabel Velasco RN

## 2019-06-10 DIAGNOSIS — I48.91 ATRIAL FIBRILLATION, UNSPECIFIED TYPE (HCC): Primary | ICD-10-CM

## 2019-06-11 ENCOUNTER — READMISSION MANAGEMENT (OUTPATIENT)
Dept: CALL CENTER | Facility: HOSPITAL | Age: 74
End: 2019-06-11

## 2019-06-11 NOTE — OUTREACH NOTE
Stroke Week 1 Survey      Responses   Facility patient discharged from?  [place]   Does the patient have one of the following disease processes/diagnoses(primary or secondary)?  Stroke (TIA)   Is there a successful TCM telephone encounter documented?  No   Week 1 attempt successful?  No   Unsuccessful attempts  Attempt 1          Janee Beauchamp RN

## 2019-06-12 ENCOUNTER — READMISSION MANAGEMENT (OUTPATIENT)
Dept: CALL CENTER | Facility: HOSPITAL | Age: 74
End: 2019-06-12

## 2019-06-12 NOTE — OUTREACH NOTE
Stroke Week 1 Survey      Responses   Facility patient discharged from?  [facility]   Does the patient have one of the following disease processes/diagnoses(primary or secondary)?  Stroke (TIA)   Is there a successful TCM telephone encounter documented?  No   Week 1 attempt successful?  Yes   Call start time  0951   Call end time  1003   Discharge diagnosis  CVA, Acute,Ischemic Stroke, HLD, PFO, TIA, essential HTN,NIDDM II    Is patient permission given to speak with other caregiver?  Yes   List who call center can speak with  Daughter, Delmi Segura is a nurse.   Meds reviewed with patient/caregiver?  Yes   Is the patient having any side effects they believe may be caused by any medication additions or changes?  No   Does the patient have all medications ordered at discharge?  Yes   Is the patient taking all medications as directed (includes completed medication regime)?  Yes   Does the patient have a primary care provider?   Yes   Does the patient have an appointment with their PCP within 7 days of discharge?  Greater than 7 days   Comments regarding PCP  Can call at any time if appt needed with PCP   Nursing Interventions  Educated patient on importance of making appointment, Advised patient to make appointment   Has the patient kept scheduled appointments due by today?  N/A   Has home health visited the patient within 72 hours of discharge?  N/A   What DME was ordered?  [facility]   Psychosocial issues?  No   Does the patient require any assistance with activities of daily living such as eating, bathing, dressing, walking, etc.?  No   Does the patient have any residual symptoms from stroke/TIA?  No   Does the patient understand the diet ordered at discharge?  Yes   Did the patient receive a copy of their discharge instructions?  Yes   Nursing interventions  Reviewed instructions with patient   What is the patient's perception of their health status since discharge?  Improving   Nursing interventions  Nurse  "provided patient education   Is the patient able to teach back FAST for Stroke?  Yes   Is the patient/caregiver able to teach back the risk factors for a stroke?  High Cholesterol, Diabetes, High blood pressure-goal below 120/80, Smoking, Physical inactivity and obesity, Carotid or other artery disease, History of TIAs, History of Afib, HIgh red blood cell count, Excessive alcohol intake, Illegal drug use, Sleep apnea   Is the patient/caregiver able to teach back signs and symptoms related to disease process for when to call PCP?  Yes   Is the patient/caregiver able to teach back signs and symptoms related to disease process for when to call 911?  Yes   If the patient is a current smoker, are they able to teach back resources for cessation?  -- [non smoker]   Is the patient/caregiver able to teach back the hierarchy of who to call/visit for symptoms/problems? PCP, Specialist, Home health nurse, Urgent Care, ED, 911  Yes   Additional teach back comments  Wearing heart moniter for 30 days.   Week 1 call completed?  Yes   Wrap up additional comments  \"All the girls were so good\"          Sarah Templeton RN  "

## 2019-06-21 ENCOUNTER — READMISSION MANAGEMENT (OUTPATIENT)
Dept: CALL CENTER | Facility: HOSPITAL | Age: 74
End: 2019-06-21

## 2019-06-21 NOTE — OUTREACH NOTE
Stroke Week 2 Survey      Responses   Facility patient discharged from?  [facility]   Does the patient have one of the following disease processes/diagnoses(primary or secondary)?  Stroke (TIA)   Week 2 attempt successful?  No   Unsuccessful attempts  Attempt 1          Patience Jon RN

## 2019-06-24 ENCOUNTER — READMISSION MANAGEMENT (OUTPATIENT)
Dept: CALL CENTER | Facility: HOSPITAL | Age: 74
End: 2019-06-24

## 2019-06-24 NOTE — OUTREACH NOTE
Stroke Week 2 Survey      Responses   Facility patient discharged from?  [facility]   Does the patient have one of the following disease processes/diagnoses(primary or secondary)?  Stroke (TIA)   Week 2 attempt successful?  Yes   Call start time  1315   Call end time  1318   Discharge diagnosis  CVA, Acute,Ischemic Stroke, HLD, PFO, TIA, essential HTN,NIDDM II    Meds reviewed with patient/caregiver?  Yes   Is the patient having any side effects they believe may be caused by any medication additions or changes?  No   Does the patient have all medications ordered at discharge?  Yes   Is the patient taking all medications as directed (includes completed medication regime)?  Yes   Does the patient have a primary care provider?   Yes   Does the patient have an appointment with their PCP within 7 days of discharge?  Yes   Has the patient kept scheduled appointments due by today?  Yes   Has home health visited the patient within 72 hours of discharge?  N/A   What DME was ordered?  Walker   Has all DME been delivered?  Yes   DME comments  RN daughter helping pt with 30 day monitor.   Psychosocial issues?  No   Does the patient require any assistance with activities of daily living such as eating, bathing, dressing, walking, etc.?  No   Does the patient have any residual symptoms from stroke/TIA?  No   Does the patient understand the diet ordered at discharge?  Yes   Did the patient receive a copy of their discharge instructions?  Yes   Nursing interventions  Reviewed instructions with patient   What is the patient's perception of their health status since discharge?  Improving   Nursing interventions  Nurse provided patient education   Is the patient able to teach back FAST for Stroke?  Yes   Is the patient/caregiver able to teach back the risk factors for a stroke?  High Cholesterol, Diabetes, High blood pressure-goal below 120/80, Smoking, Physical inactivity and obesity, Carotid or other artery disease, History of TIAs,  History of Afib, HIgh red blood cell count, Excessive alcohol intake, Illegal drug use, Sleep apnea   Is the patient/caregiver able to teach back signs and symptoms related to disease process for when to call PCP?  Yes   Is the patient/caregiver able to teach back signs and symptoms related to disease process for when to call 911?  Yes   Is the patient/caregiver able to teach back the hierarchy of who to call/visit for symptoms/problems? PCP, Specialist, Home health nurse, Urgent Care, ED, 911  Yes   Week 2 call completed?  Yes          Talat Vidales RN

## 2019-07-01 ENCOUNTER — READMISSION MANAGEMENT (OUTPATIENT)
Dept: CALL CENTER | Facility: HOSPITAL | Age: 74
End: 2019-07-01

## 2019-07-01 NOTE — OUTREACH NOTE
Stroke Week 3 Survey      Responses   Facility patient discharged from?  [facility]   Does the patient have one of the following disease processes/diagnoses(primary or secondary)?  Stroke (TIA)   Week 3 attempt successful?  Yes   Call start time  1715   Call end time  1720   Discharge diagnosis  CVA, Acute,Ischemic Stroke, HLD, PFO, TIA, essential HTN,NIDDM II    Meds reviewed with patient/caregiver?  Yes   Is the patient having any side effects they believe may be caused by any medication additions or changes?  No   Does the patient have all medications ordered at discharge?  Yes   Is the patient taking all medications as directed (includes completed medication regime)?  Yes   Does the patient have a primary care provider?   Yes   Does the patient have an appointment with their PCP within 7 days of discharge?  Yes   Has the patient kept scheduled appointments due by today?  Yes   Has home health visited the patient within 72 hours of discharge?  N/A   What DME was ordered?  Walker   Has all DME been delivered?  Yes   Psychosocial issues?  No   Does the patient require any assistance with activities of daily living such as eating, bathing, dressing, walking, etc.?  No   Does the patient have any residual symptoms from stroke/TIA?  Yes   Does the patient understand the diet ordered at discharge?  Yes   Did the patient receive a copy of their discharge instructions?  Yes   Nursing interventions  Reviewed instructions with patient   What is the patient's perception of their health status since discharge?  Improving   Nursing interventions  Nurse provided patient education   Is the patient able to teach back FAST for Stroke?  Yes   Is the patient/caregiver able to teach back the risk factors for a stroke?  High Cholesterol, Diabetes, High blood pressure-goal below 120/80, Smoking, Physical inactivity and obesity, Carotid or other artery disease, History of TIAs, History of Afib, HIgh red blood cell count, Excessive  alcohol intake, Illegal drug use, Sleep apnea   Is the patient/caregiver able to teach back signs and symptoms related to disease process for when to call PCP?  Yes   Is the patient/caregiver able to teach back signs and symptoms related to disease process for when to call 911?  Yes   Is the patient/caregiver able to teach back the hierarchy of who to call/visit for symptoms/problems? PCP, Specialist, Home health nurse, Urgent Care, ED, 911  Yes   Week 3 call completed?  Yes          Talat Vidales RN

## 2019-07-10 ENCOUNTER — READMISSION MANAGEMENT (OUTPATIENT)
Dept: CALL CENTER | Facility: HOSPITAL | Age: 74
End: 2019-07-10

## 2019-07-10 NOTE — OUTREACH NOTE
Stroke Week 4 Survey      Responses   Facility patient discharged from?  [facility]   Does the patient have one of the following disease processes/diagnoses(primary or secondary)?  Stroke (TIA)   Week 4 attempt successful?  Yes   Call start time  1246   Call end time  1248   Discharge diagnosis  CVA, Acute,Ischemic Stroke, HLD, PFO, TIA, essential HTN,NIDDM II    Is patient permission given to speak with other caregiver?  Yes   List who call center can speak with  Conchita Cedeno reviewed with patient/caregiver?  Yes   Is the patient having any side effects they believe may be caused by any medication additions or changes?  No   Is the patient taking all medications as directed (includes completed medication regime)?  Yes   Has the patient kept scheduled appointments due by today?  Yes   Is the patient still receiving Home Health Services?  N/A   Psychosocial issues?  No   Does the patient require any assistance with activities of daily living such as eating, bathing, dressing, walking, etc.?  No   Does the patient have any residual symptoms from stroke/TIA?  Yes   Does the patient understand the diet ordered at discharge?  Yes   What is the patient's perception of their health status since discharge?  Improving   Nursing interventions  Nurse provided patient education   Is the patient able to teach back FAST for Stroke?  Yes   Is the patient/caregiver able to teach back the risk factors for a stroke?  High blood pressure-goal below 120/80, Diabetes, High Cholesterol, History of TIAs   Is the patient/caregiver able to teach back signs and symptoms related to disease process for when to call PCP?  Yes   Is the patient/caregiver able to teach back signs and symptoms related to disease process for when to call 911?  Yes   Is the patient/caregiver able to teach back the hierarchy of who to call/visit for symptoms/problems? PCP, Specialist, Home health nurse, Urgent Care, ED, 911  Yes   Week 4 Call Completed?   Yes   Would the patient like one additional call?  No   Graduated  Yes   Did the patient feel the follow up calls were helpful during their recovery period?  Yes   Was the number of calls appropriate?  Yes          Janee Beauchamp RN

## 2019-07-18 ENCOUNTER — OFFICE VISIT (OUTPATIENT)
Dept: NEUROLOGY | Facility: CLINIC | Age: 74
End: 2019-07-18

## 2019-07-18 VITALS
WEIGHT: 127 LBS | BODY MASS INDEX: 21.16 KG/M2 | HEART RATE: 76 BPM | OXYGEN SATURATION: 96 % | SYSTOLIC BLOOD PRESSURE: 110 MMHG | DIASTOLIC BLOOD PRESSURE: 60 MMHG | HEIGHT: 65 IN

## 2019-07-18 DIAGNOSIS — R26.9 ABNORMALITY OF GAIT AS LATE EFFECT OF CEREBROVASCULAR ACCIDENT (CVA): ICD-10-CM

## 2019-07-18 DIAGNOSIS — I69.398 ABNORMALITY OF GAIT AS LATE EFFECT OF CEREBROVASCULAR ACCIDENT (CVA): ICD-10-CM

## 2019-07-18 DIAGNOSIS — H54.40 BLIND RIGHT EYE: ICD-10-CM

## 2019-07-18 DIAGNOSIS — F03.90 DEMENTIA WITHOUT BEHAVIORAL DISTURBANCE, UNSPECIFIED DEMENTIA TYPE: ICD-10-CM

## 2019-07-18 DIAGNOSIS — I63.9 CEREBROVASCULAR ACCIDENT (CVA), UNSPECIFIED MECHANISM (HCC): Primary | ICD-10-CM

## 2019-07-18 DIAGNOSIS — Q21.12 PFO (PATENT FORAMEN OVALE): ICD-10-CM

## 2019-07-18 PROCEDURE — 99214 OFFICE O/P EST MOD 30 MIN: CPT | Performed by: NURSE PRACTITIONER
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
## 2019-07-25 NOTE — PROGRESS NOTES
Encounter Date:07/30/2019    Patient ID: Isabel Niel is a 74 y.o. female who resides in [place], [place]    CC/Reason for visit:  Transient Ischemic Attack; Hyperlipidemia; and PFO           Problem List Items Addressed This Visit        Cardiovascular and Mediastinum    TIA (transient ischemic attack) vs aborted CVA    Overview     · CT cerebral perfusion (6/4/2019): No evidence of acute ischemia  · MRI of the brain (6/4/2019): Moderate atrophy with moderate chronic ischemic changes around the ventricles. No evidence of recent infarct  · CT of the head without contrast (6/5/2019):Chronic appearing changes of the aging brain.  No evidence of acute intracranial disease.  · MCOT (07/15/2019): 20 day monitor. No atrial fibrillation.         Current Assessment & Plan     · No arrhythmias noted on recent monitor  · Continue Eliquis 5 mg twice daily         PFO (patent foramen ovale)    Overview     · Echo (6/5/2019): Saline results positive indicating PFO  · RoPE score 1 suggesting 0% chance that stroke/TIA related to PFO         Current Assessment & Plan     · PFO closure not recommended as rope score is 1 suggesting 0% chance that her CVA/TIA was related to PFO         Essential hypertension - Primary    Current Assessment & Plan     · Hypertension is controlled  · Continue metoprolol tartrate 50 mg twice daily             Patient's monitor did not show any atrial fibrillation but I am hesitant to take her off her Eliquis as the patient did have a stroke that required TPA be given.  She could be having some rare episodes of atrial fibrillation and although the monitor did not show any this does not completely rule that out.  I had this discussion with the patient and we discussed the pros and cons of anticoagulation versus no anticoagulation and aspirin therapy.  The patient desires to continue taking Eliquis for now.  We will schedule follow-up for 6 months or sooner if needed.       · Continue Eliquis 5 mg twice  daily.  Contact us if any further episodes of blood in stool as she will need to stop Eliquis.  · Follow-up with gastroenterology as ordered by neurology  Return in about 6 months (around 1/30/2020), or if symptoms worsen or fail to improve.            Isabel Neil returns today for follow-up after recent hospitalization where the patient presented with symptoms concerning for a CVA.  In the ER she was given TPA with resolution of symptoms.  An echocardiogram was performed which had a positive bubble study and we were consulted for evaluation.  The patient's risk of paradoxical embolism from a PFO score was 1 suggesting there was 0% chance that her stroke/TIA could be related to her PFO so closure was not recommended.  However there was concern patient may have occult episodes of atrial fibrillation that could be the culprit of her symptoms.  At discharge a 30-day MCOT monitor was ordered and today she presents to discuss those results.  The patient's monitor showed normal sinus rhythm and no arrhythmias noted.  Particularly no atrial fibrillation or flutter.  The patient was discharged home on Eliquis but was also taking a baby aspirin.  She has since followed up with neurology and told them she was having some blood in her stools.  She reports seeing gastroenterology in the distant past due to history of GI bleeding.  She stopped taking the aspirin on her own but has continued taking Eliquis and has not had any further blood in her stool since.  At neurology's evaluation they recommended she continue taking her Eliquis.  She denies any further signs or symptoms of a TIA or stroke.  She denies chest pain, increased dyspnea, orthopnea, palpitations or syncope.    Review of Systems   Constitution: Negative for weakness and malaise/fatigue.   Eyes: Positive for vision loss in left eye and vision loss in right eye.   Cardiovascular: Positive for irregular heartbeat. Negative for chest pain, dyspnea on exertion,  near-syncope, orthopnea, palpitations, paroxysmal nocturnal dyspnea and syncope.   Endocrine: Positive for polydipsia.   Skin: Positive for dry skin.   Musculoskeletal: Positive for joint pain and joint swelling. Negative for myalgias.   Gastrointestinal: Positive for change in bowel habit.   Neurological: Positive for loss of balance. Negative for brief paralysis, excessive daytime sleepiness, focal weakness, numbness and paresthesias.   All other systems reviewed and are negative.      The patient's past medical, social, family history and ROS reviewed in the patient's electronic medical record.    Allergies  Sulfa antibiotics    Outpatient Medications Marked as Taking for the 7/30/19 encounter (Office Visit) with Samantha Camara APRN   Medication Sig Dispense Refill   • albuterol sulfate  (90 Base) MCG/ACT inhaler Inhale 2 puffs Every 4 (Four) Hours As Needed for Wheezing.     • apixaban (ELIQUIS) 5 MG tablet tablet Take 1 tablet by mouth Every 12 (Twelve) Hours. 60 tablet 2   • atorvastatin (LIPITOR) 80 MG tablet Take 1 tablet by mouth Every Night. 30 tablet 2   • calcium carbonate (OS-VU) 600 MG tablet Take 1,200 mg by mouth Daily.     • diazePAM (VALIUM) 5 MG tablet Take 5 mg by mouth At Night As Needed for Anxiety.     • hyoscyamine sulfate (ANASPAZ) 0.125 MG tablet dispersible disintegrating tablet Take 125 mcg by mouth Every 4 (Four) Hours As Needed.     • memantine (NAMENDA) 10 MG tablet Take 10 mg by mouth 2 (Two) Times a Day.     • metFORMIN (GLUCOPHAGE) 1000 MG tablet Take 1,000 mg by mouth 2 (Two) Times a Day With Meals.     • metoprolol tartrate (LOPRESSOR) 50 MG tablet Take 50 mg by mouth 2 (Two) Times a Day.     • nystatin (MYCOSTATIN) 882013 UNIT/ML suspension Swish and swallow 500,000 Units As Needed.     • omeprazole-sodium bicarbonate (ZEGERID)  MG per capsule Take 1 capsule by mouth Daily.     • sertraline (ZOLOFT) 50 MG tablet Take 50 mg by mouth Daily.     • timolol  "(TIMOPTIC) 0.5 % ophthalmic solution Administer 1 drop to both eyes Every Night.             Blood pressure 110/56, pulse 64, height 165.1 cm (65\"), weight 57.2 kg (126 lb 3.2 oz), SpO2 91 %, not currently breastfeeding.  Body mass index is 21 kg/m².  Vitals:    07/30/19 1354   Patient Position: Sitting       Physical Exam   Constitutional: She is oriented to person, place, and time. She appears well-developed and well-nourished.   HENT:   Head: Normocephalic and atraumatic.   Eyes: Pupils are equal, round, and reactive to light. No scleral icterus.   Neck: No JVD present. Carotid bruit is not present. No thyromegaly present.   Cardiovascular: Normal rate and regular rhythm. Exam reveals no gallop.   No murmur heard.  Pulmonary/Chest: Effort normal and breath sounds normal.   Abdominal: Soft. She exhibits no distension. There is no hepatosplenomegaly.   Musculoskeletal: She exhibits no edema.   Neurological: She is alert and oriented to person, place, and time.   Skin: Skin is warm and dry.   Psychiatric: She has a normal mood and affect. Her behavior is normal.       Data Review (reviewed with patient):     Procedures    Lab Results   Component Value Date    CHOL 133 06/05/2019    TRIG 235 (H) 06/05/2019    HDL 30 (L) 06/05/2019    LDL 56 06/05/2019    AST 16 06/05/2019    ALT 5 06/05/2019       Lab Results   Component Value Date    HGBA1C 5.90 (H) 06/05/2019           BALBINA Lester  7/30/2019  "

## 2019-07-30 ENCOUNTER — OFFICE VISIT (OUTPATIENT)
Dept: CARDIOLOGY | Facility: CLINIC | Age: 74
End: 2019-07-30

## 2019-07-30 VITALS
BODY MASS INDEX: 21.02 KG/M2 | WEIGHT: 126.2 LBS | HEIGHT: 65 IN | HEART RATE: 64 BPM | DIASTOLIC BLOOD PRESSURE: 56 MMHG | OXYGEN SATURATION: 91 % | SYSTOLIC BLOOD PRESSURE: 110 MMHG

## 2019-07-30 DIAGNOSIS — G45.9 TIA (TRANSIENT ISCHEMIC ATTACK): ICD-10-CM

## 2019-07-30 DIAGNOSIS — Q21.12 PFO (PATENT FORAMEN OVALE): ICD-10-CM

## 2019-07-30 DIAGNOSIS — I10 ESSENTIAL HYPERTENSION: Primary | ICD-10-CM

## 2019-07-30 PROCEDURE — 99214 OFFICE O/P EST MOD 30 MIN: CPT | Performed by: NURSE PRACTITIONER

## 2019-07-30 NOTE — ASSESSMENT & PLAN NOTE
· PFO closure not recommended as rope score is 1 suggesting 0% chance that her CVA/TIA was related to PFO

## 2019-08-26 ENCOUNTER — CALL CENTER PROGRAMS (OUTPATIENT)
Dept: CALL CENTER | Facility: HOSPITAL | Age: 74
End: 2019-08-26

## 2019-08-26 NOTE — OUTREACH NOTE
"Stroke [facility] Survey      Responses   Facility patient discharged from?  [facility]   Attempt successful  Yes   Call start time  1136   Person spoke with today (if not patient) and relationship  Patient   Did the patient die within 30 days of admission?  No   Did the patient die within 30 days of discharge?  No   Call end time  1140   Patient location 30 days post discharge if known  Home   Was the patient readmitted within 30 days of discharge?  No   Could you live alone without any help from another person?  Yes   Can you do everything that you were doing right before your stroke even if slower and not as much?  Yes   Are you completely back to the way you were right before your stroke?  Yes   Can you walk from one room to another without help from another person?  Yes   Can the patient sit up in bed without any help?  Yes   Call Center Ulm Score  0   Ulm score call completed  Yes   Comments  Patient reports no residual stroke symptoms.  She is independent at home with ADLs and ambulation.  She will use a walker for longer distances.  She drives occasionally.  She is very thankful for [facility] and states \"I am glad I came there. \"          Nannette Thakur RN  "

## 2019-11-07 ENCOUNTER — OFFICE VISIT (OUTPATIENT)
Dept: NEUROLOGY | Facility: CLINIC | Age: 74
End: 2019-11-07

## 2019-11-07 VITALS
BODY MASS INDEX: 21.16 KG/M2 | SYSTOLIC BLOOD PRESSURE: 142 MMHG | DIASTOLIC BLOOD PRESSURE: 66 MMHG | WEIGHT: 127 LBS | HEIGHT: 65 IN

## 2019-11-07 DIAGNOSIS — G45.9 TIA (TRANSIENT ISCHEMIC ATTACK): ICD-10-CM

## 2019-11-07 DIAGNOSIS — I69.398 ABNORMALITY OF GAIT AS LATE EFFECT OF CEREBROVASCULAR ACCIDENT (CVA): ICD-10-CM

## 2019-11-07 DIAGNOSIS — R26.9 ABNORMALITY OF GAIT AS LATE EFFECT OF CEREBROVASCULAR ACCIDENT (CVA): ICD-10-CM

## 2019-11-07 DIAGNOSIS — F03.90 DEMENTIA WITHOUT BEHAVIORAL DISTURBANCE, UNSPECIFIED DEMENTIA TYPE: Primary | ICD-10-CM

## 2019-11-07 PROCEDURE — 99213 OFFICE O/P EST LOW 20 MIN: CPT | Performed by: NURSE PRACTITIONER
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
## 2023-05-07 ENCOUNTER — APPOINTMENT (OUTPATIENT)
Dept: GENERAL RADIOLOGY | Facility: HOSPITAL | Age: 78
End: 2023-05-07
Payer: MEDICARE

## 2023-05-07 ENCOUNTER — APPOINTMENT (OUTPATIENT)
Dept: CT IMAGING | Facility: HOSPITAL | Age: 78
End: 2023-05-07
Payer: MEDICARE

## 2023-05-07 ENCOUNTER — HOSPITAL ENCOUNTER (EMERGENCY)
Facility: HOSPITAL | Age: 78
Discharge: HOME | End: 2023-05-07
Attending: EMERGENCY MEDICINE | Admitting: EMERGENCY MEDICINE
Payer: MEDICARE

## 2023-05-07 VITALS
WEIGHT: 115 LBS | SYSTOLIC BLOOD PRESSURE: 136 MMHG | TEMPERATURE: 98.8 F | HEART RATE: 96 BPM | HEIGHT: 65 IN | OXYGEN SATURATION: 95 % | DIASTOLIC BLOOD PRESSURE: 69 MMHG | BODY MASS INDEX: 19.16 KG/M2 | RESPIRATION RATE: 20 BRPM

## 2023-05-07 DIAGNOSIS — S09.90XA CLOSED HEAD INJURY, INITIAL ENCOUNTER: Primary | ICD-10-CM

## 2023-05-07 DIAGNOSIS — G30.9 MILD ALZHEIMER'S DEMENTIA WITHOUT BEHAVIORAL DISTURBANCE, PSYCHOTIC DISTURBANCE, MOOD DISTURBANCE, OR ANXIETY, UNSPECIFIED TIMING OF DEMENTIA ONSET: ICD-10-CM
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
## 2023-05-07 DIAGNOSIS — F02.A0 MILD ALZHEIMER'S DEMENTIA WITHOUT BEHAVIORAL DISTURBANCE, PSYCHOTIC DISTURBANCE, MOOD DISTURBANCE, OR ANXIETY, UNSPECIFIED TIMING OF DEMENTIA ONSET: ICD-10-CM

## 2023-05-07 DIAGNOSIS — E83.42 HYPOMAGNESEMIA: ICD-10-CM

## 2023-05-07 LAB
ALBUMIN SERPL-MCNC: 3.6 G/DL (ref 3.5–5.2)
ALBUMIN/GLOB SERPL: 1.1 G/DL
ALP SERPL-CCNC: 172 U/L (ref 39–117)
ALT SERPL W P-5'-P-CCNC: 9 U/L (ref 1–33)
ANION GAP SERPL CALCULATED.3IONS-SCNC: 15 MMOL/L (ref 5–15)
AST SERPL-CCNC: 26 U/L (ref 1–32)
BACTERIA UR QL AUTO: NORMAL /HPF
BASOPHILS # BLD AUTO: 0.07 10*3/MM3 (ref 0–0.2)
BASOPHILS NFR BLD AUTO: 0.6 % (ref 0–1.5)
BILIRUB SERPL-MCNC: 0.2 MG/DL (ref 0–1.2)
BILIRUB UR QL STRIP: NEGATIVE
BUN SERPL-MCNC: 17 MG/DL (ref 8–23)
BUN/CREAT SERPL: 14.7 (ref 7–25)
CALCIUM SPEC-SCNC: 7.9 MG/DL (ref 8.6–10.5)
CHLORIDE SERPL-SCNC: 108 MMOL/L (ref 98–107)
CLARITY UR: CLEAR
CO2 SERPL-SCNC: 19 MMOL/L (ref 22–29)
COLOR UR: YELLOW
CREAT SERPL-MCNC: 1.16 MG/DL (ref 0.57–1)
DEPRECATED RDW RBC AUTO: 47.9 FL (ref 37–54)
EGFRCR SERPLBLD CKD-EPI 2021: 47.8 ML/MIN/1.73
EOSINOPHIL # BLD AUTO: 0.03 10*3/MM3 (ref 0–0.4)
EOSINOPHIL NFR BLD AUTO: 0.3 % (ref 0.3–6.2)
ERYTHROCYTE [DISTWIDTH] IN BLOOD BY AUTOMATED COUNT: 14.6 % (ref 12.3–15.4)
GLOBULIN UR ELPH-MCNC: 3.3 GM/DL
GLUCOSE SERPL-MCNC: 125 MG/DL (ref 65–99)
GLUCOSE UR STRIP-MCNC: NEGATIVE MG/DL
HCT VFR BLD AUTO: 39 % (ref 34–46.6)
HGB BLD-MCNC: 12.1 G/DL (ref 12–15.9)
HGB UR QL STRIP.AUTO: NEGATIVE
HOLD SPECIMEN: NORMAL
HYALINE CASTS UR QL AUTO: NORMAL /LPF
IMM GRANULOCYTES # BLD AUTO: 0.05 10*3/MM3 (ref 0–0.05)
IMM GRANULOCYTES NFR BLD AUTO: 0.4 % (ref 0–0.5)
KETONES UR QL STRIP: ABNORMAL
LEUKOCYTE ESTERASE UR QL STRIP.AUTO: NEGATIVE
LYMPHOCYTES # BLD AUTO: 2.47 10*3/MM3 (ref 0.7–3.1)
LYMPHOCYTES NFR BLD AUTO: 20.6 % (ref 19.6–45.3)
MAGNESIUM SERPL-MCNC: 0.9 MG/DL (ref 1.6–2.4)
MCH RBC QN AUTO: 28.1 PG (ref 26.6–33)
MCHC RBC AUTO-ENTMCNC: 31 G/DL (ref 31.5–35.7)
MCV RBC AUTO: 90.7 FL (ref 79–97)
MONOCYTES # BLD AUTO: 1.11 10*3/MM3 (ref 0.1–0.9)
MONOCYTES NFR BLD AUTO: 9.3 % (ref 5–12)
NEUTROPHILS NFR BLD AUTO: 68.8 % (ref 42.7–76)
NEUTROPHILS NFR BLD AUTO: 8.24 10*3/MM3 (ref 1.7–7)
NITRITE UR QL STRIP: NEGATIVE
NRBC BLD AUTO-RTO: 0 /100 WBC (ref 0–0.2)
PH UR STRIP.AUTO: 5.5 [PH] (ref 5–8)
PLATELET # BLD AUTO: 349 10*3/MM3 (ref 140–450)
PMV BLD AUTO: 10.4 FL (ref 6–12)
POTASSIUM SERPL-SCNC: 4.2 MMOL/L (ref 3.5–5.2)
PROT SERPL-MCNC: 6.9 G/DL (ref 6–8.5)
PROT UR QL STRIP: ABNORMAL
RBC # BLD AUTO: 4.3 10*6/MM3 (ref 3.77–5.28)
RBC # UR STRIP: NORMAL /HPF
REF LAB TEST METHOD: NORMAL
SODIUM SERPL-SCNC: 142 MMOL/L (ref 136–145)
SP GR UR STRIP: 1.02 (ref 1–1.03)
SQUAMOUS #/AREA URNS HPF: NORMAL /HPF
TSH SERPL DL<=0.05 MIU/L-ACNC: 1.68 UIU/ML (ref 0.27–4.2)
UROBILINOGEN UR QL STRIP: ABNORMAL
WBC # UR STRIP: NORMAL /HPF
WBC NRBC COR # BLD: 11.97 10*3/MM3 (ref 3.4–10.8)
WHOLE BLOOD HOLD COAG: NORMAL
WHOLE BLOOD HOLD SPECIMEN: NORMAL

## 2023-05-07 PROCEDURE — 85025 COMPLETE CBC W/AUTO DIFF WBC: CPT | Performed by: EMERGENCY MEDICINE

## 2023-05-07 PROCEDURE — 73562 X-RAY EXAM OF KNEE 3: CPT

## 2023-05-07 PROCEDURE — 99284 EMERGENCY DEPT VISIT MOD MDM: CPT

## 2023-05-07 PROCEDURE — 84443 ASSAY THYROID STIM HORMONE: CPT | Performed by: EMERGENCY MEDICINE

## 2023-05-07 PROCEDURE — 70450 CT HEAD/BRAIN W/O DYE: CPT

## 2023-05-07 PROCEDURE — P9612 CATHETERIZE FOR URINE SPEC: HCPCS

## 2023-05-07 PROCEDURE — 25010000002 MAGNESIUM SULFATE IN D5W 1G/100ML (PREMIX) 1-5 GM/100ML-% SOLUTION: Performed by: EMERGENCY MEDICINE

## 2023-05-07 PROCEDURE — 81001 URINALYSIS AUTO W/SCOPE: CPT | Performed by: EMERGENCY MEDICINE

## 2023-05-07 PROCEDURE — 71101 X-RAY EXAM UNILAT RIBS/CHEST: CPT

## 2023-05-07 PROCEDURE — 83735 ASSAY OF MAGNESIUM: CPT | Performed by: EMERGENCY MEDICINE

## 2023-05-07 PROCEDURE — 36415 COLL VENOUS BLD VENIPUNCTURE: CPT

## 2023-05-07 PROCEDURE — 96365 THER/PROPH/DIAG IV INF INIT: CPT

## 2023-05-07 PROCEDURE — 80053 COMPREHEN METABOLIC PANEL: CPT | Performed by: EMERGENCY MEDICINE

## 2023-05-07 RX ORDER — MAGNESIUM SULFATE 1 G/100ML
1 INJECTION INTRAVENOUS ONCE
Status: COMPLETED | OUTPATIENT
Start: 2023-05-07 | End: 2023-05-07

## 2023-05-07 RX ADMIN — MAGNESIUM SULFATE HEPTAHYDRATE 1 G: 1 INJECTION, SOLUTION INTRAVENOUS at 18:51

## 2023-05-08 NOTE — ED PROVIDER NOTES
Subjective   History of Present Illness  Is a 78-year-old female presented to the emergency department with some confusion and altered mental status.  The patient does apparently have a history of dementia.  Apparently the patient fell 2 weeks ago.  She fell backwards walking up the stairs and hit her head.  She not seek medical care at that time.  Family is concerned because they saw her this morning and she was more confused than normal.  She occasionally has some forgetfulness, however she was not sure where she was this morning.  Symptoms were going on since about 4 AM per her boyfriend who lives with her.  He states that they are getting slightly better, however she is still slightly confused from baseline.  On initial arrival, the patient is alert and appropriate.  She is answering questions without difficulty.  She is complaining of some left-sided rib pain and left knee pain from when she fell.  She is not having any headache or change in vision.  No focal weakness.  She denies any shortness of breath or cough.  No abdominal pain or vomiting.    History provided by:  Patient, EMS personnel, friend and relative   used: No        Review of Systems   Constitutional: Negative for chills and fever.   HENT: Negative for congestion, ear pain and sore throat.    Eyes: Negative for visual disturbance.   Respiratory: Negative for shortness of breath.    Cardiovascular: Negative for chest pain.   Gastrointestinal: Negative for abdominal pain.   Genitourinary: Negative for difficulty urinating.   Musculoskeletal: Positive for arthralgias and myalgias.   Skin: Negative for rash.   Neurological: Negative for dizziness, weakness and numbness.   Psychiatric/Behavioral: Positive for confusion. Negative for agitation.       Past Medical History:   Diagnosis Date   • Anxiety    • Blindness of right eye     Post trauma   • Colon polyp    • COPD (chronic obstructive pulmonary disease)    • CVA (cerebral  vascular accident) 2019   • Dementia    • Depression    • Diabetes mellitus    • Dyslipidemia    • History of irregular heartbeat    • Ulcerative colitis        Allergies   Allergen Reactions   • Sulfa Antibiotics Unknown (See Comments)     Unknown        Past Surgical History:   Procedure Laterality Date   • COLONOSCOPY W/ POLYPECTOMY  2018   • HYSTERECTOMY     • SKIN CANCER EXCISION         Family History   Problem Relation Age of Onset   • Alzheimer's disease Mother    • Diabetes Mother    • Breast cancer Sister    • Heart attack Brother    • Alzheimer's disease Maternal Grandmother    • Coronary artery disease Sister    • Diabetes Sister    • Valvular heart disease Sister    • Lung disease Brother    • Melanoma Brother    • No Known Problems Brother        Social History     Socioeconomic History   • Marital status:    • Number of children: 2   Tobacco Use   • Smoking status: Former     Packs/day: 1.00     Years: 50.00     Pack years: 50.00     Types: Cigarettes     Quit date: 2009     Years since quittin.9   • Smokeless tobacco: Never   Substance and Sexual Activity   • Alcohol use: No   • Drug use: Defer   • Sexual activity: Defer           Objective   Physical Exam  Vitals and nursing note reviewed.   Constitutional:       General: She is not in acute distress.     Appearance: She is not ill-appearing or toxic-appearing.   HENT:      Mouth/Throat:      Pharynx: No posterior oropharyngeal erythema.   Eyes:      Conjunctiva/sclera: Conjunctivae normal.      Pupils: Pupils are equal, round, and reactive to light.   Cardiovascular:      Rate and Rhythm: Normal rate and regular rhythm.   Pulmonary:      Effort: Pulmonary effort is normal. No respiratory distress.   Abdominal:      General: Abdomen is flat. There is no distension.      Palpations: There is no mass.      Tenderness: There is no abdominal tenderness. There is no guarding or rebound.   Musculoskeletal:         General: No  deformity. Normal range of motion.      Comments: Patient has some mild tenderness palpation the left knee.  Is no obvious deformity.  She also has no tenderness along the left lateral chest.  Normal chest rise and fall.  Compartments are soft.  Neurovascular intact.   Skin:     General: Skin is warm.      Findings: No rash.   Neurological:      General: No focal deficit present.      Mental Status: She is alert and oriented to person, place, and time.      Motor: No weakness.         Procedures           ED Course  ED Course as of 05/07/23 2030   Sun May 07, 2023   1843 Patient found to be significantly hypomagnesemic.  IV magnesium administered. [JK]   2027 Magnesium(!!) [JK]   2027 CBC & Differential(!) [JK]   2027 TSH [JK]   2027 Urinalysis, Microscopic Only - Straight Cath [JK]   2027 Urinalysis With Culture If Indicated - Straight Cath(!) [JK]   2027 Comprehensive Metabolic Panel(!) [JK]   2027 XR Knee 3 View Left [JK]   2027 XR Ribs Left With PA Chest [JK]   2027 CT Head Without Contrast  Imaging of the left knee, left ribs and head does not show any acute process [JK]   2027 On reevaluation, the patient remains at her baseline.  Both the boyfriend and the daughter are at bedside.  I did have a discussion with them regarding the patient's presentation.  I do believe she is suffering from a closed head injury as well as progression of her dementia.  We did replace her magnesium.  After discussion, we feel it is most appropriate for discharge given that the patient is mentating appropriately.  She is moving all extremities and ambulating without any significant difficulty.  They are to keep a close eye on the patient over the next 48 hours.  She is to follow-up with her PCP or return to the emergency department for repeat examination at that time.  If she is to have any worsening mentation or change in symptoms, she is to return immediately.  Both the daughter and boyfriend were given these return precautions  and verbalized understanding. [JK]      ED Course User Index  [JK] Cayden Zavaleta MD                                           Medical Decision Making  This is a 78-year-old female presenting to the emergency department with some confusion.  The patient does have complex medical history including dementia, A-fib on anticoagulation as well as frequent UTIs.  The patient seems appropriate on initial examination.  I am concerned given that she fell a few weeks ago and hit her head on anticoagulation.  Differential is broad at this time includes intracranial bleeding, intracranial mass, progressive dementia, acute kidney injury, electrolyte abnormality, musculoskeletal injury, rib fracture, knee fracture, closed head injury.  Patient was placed on continuous telemetry monitoring.  She does meet imaging criteria for the head.  Work-up initiated.    Amount and/or Complexity of Data Reviewed  Labs: ordered. Decision-making details documented in ED Course.  Radiology: ordered and independent interpretation performed. Decision-making details documented in ED Course.      Risk  Prescription drug management.          Final diagnoses:   Closed head injury, initial encounter   Hypomagnesemia   Accidental fall, initial encounter   Mild Alzheimer's dementia without behavioral disturbance, psychotic disturbance, mood disturbance, or anxiety, unspecified timing of dementia onset       ED Disposition  ED Disposition     ED Disposition   Discharge    Condition   Stable    Comment   --             Silvano Fraga MD  [address] [facility]  [address]  [facility] [address]  [phone]    Call in 1 day           Medication List      No changes were made to your prescriptions during this visit.          Cayden Zavaleta MD  05/07/23 2405

## 2023-06-19 PROBLEM — S72.001A CLOSED FRACTURE OF RIGHT HIP, INITIAL ENCOUNTER: Status: ACTIVE | Noted: 2023-06-19

## 2023-06-20 PROBLEM — E43 SEVERE MALNUTRITION: Status: ACTIVE | Noted: 2023-06-20

## 2023-07-18 ENCOUNTER — TELEPHONE (OUTPATIENT)
Dept: ORTHOPEDICS | Facility: OTHER | Age: 78
End: 2023-07-18

## 2023-07-18 NOTE — TELEPHONE ENCOUNTER
Provider: DR. GUERRA  Caller: MARAL BLANCA  Relationship to Patient: DAUGHTER  Phone Number: [phone]  Reason for Call: RESCHEDULE FIRST POST-OP    PATIENT'S DAUGHTER IS ASKING IF POST-OP ON 07/24/23 CAN BE RESCHEDULED AT LEAST A WEEK LATER THAN 07/24. SHE STATED PATIENT HAS ANOTHER APPOINTMENT THAT DAY AND WON'T MAKE IT IN TIME. PLEASE CALL PATIENT TO DISCUSS.

## 2023-07-26 ENCOUNTER — OFFICE VISIT (OUTPATIENT)
Dept: ORTHOPEDIC SURGERY | Facility: CLINIC | Age: 78
End: 2023-07-26
Payer: MEDICARE

## 2023-07-26 VITALS — TEMPERATURE: 97.1 F

## 2023-07-26 DIAGNOSIS — Z98.890 STATUS POST HIP SURGERY: Primary | ICD-10-CM

## 2023-07-26 DIAGNOSIS — S72.001D CLOSED FRACTURE OF RIGHT HIP WITH ROUTINE HEALING, SUBSEQUENT ENCOUNTER: ICD-10-CM

## 2023-07-26 PROCEDURE — 1160F RVW MEDS BY RX/DR IN RCRD: CPT | Performed by: ORTHOPAEDIC SURGERY

## 2023-07-26 PROCEDURE — 99024 POSTOP FOLLOW-UP VISIT: CPT | Performed by: ORTHOPAEDIC SURGERY

## 2023-07-26 PROCEDURE — 1159F MED LIST DOCD IN RCRD: CPT | Performed by: ORTHOPAEDIC SURGERY

## 2023-07-26 RX ORDER — MEMANTINE HYDROCHLORIDE 5 MG/1
10 TABLET ORAL 2 TIMES DAILY
COMMUNITY
Start: 2023-06-29

## 2023-07-26 RX ORDER — ATORVASTATIN CALCIUM 40 MG/1
80 TABLET, FILM COATED ORAL DAILY
COMMUNITY
Start: 2023-06-29

## 2023-07-26 RX ORDER — LORATADINE 10 MG/1
CAPSULE, LIQUID FILLED ORAL
COMMUNITY

## 2023-07-26 NOTE — PROGRESS NOTES
[facility] [place] [facility] Note    Subjective     Chief Complaint   Patient presents with    Post-op     5 weeks status post Hip Trochanteric Nailing With Intramedullary Hip Screw Right 6/20/23        HPI    It has been 5  week(s) since Ms. Neil's last visit. She returns to clinic today for postoperative follow-up of right hip fracture fixation. The issue has been ongoing for 5 week(s). She rates her pain a 0/10 on the pain scale. Previous/current treatments: cane/walker and physical therapy. Current symptoms: pain and stiffness. The pain is worse with walking, standing, sitting, and rising from seated position; resting improve the pain. Overall, she is doing better.  No complaints today.    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)    Dementia without behavioral disturbance    Closed fracture of right hip, initial encounter    Severe malnutrition     Past Medical History:   Diagnosis Date    Anxiety     Blindness of right eye     Post trauma    Colon polyp     COPD (chronic obstructive pulmonary disease)     CVA (cerebral vascular accident) 06/04/2019    Dementia     Depression     Diabetes mellitus     Dyslipidemia     History of irregular heartbeat     Ulcerative colitis       Past Surgical History:   Procedure Laterality Date    COLONOSCOPY W/ POLYPECTOMY  2018    GALLBLADDER SURGERY      HIP TROCHANTERIC NAILING WITH INTRAMEDULLARY HIP SCREW Right 6/20/2023    Procedure: HIP TROCHANTERIC NAILING WITH INTRAMEDULLARY  HIP SCREW RIGHT;  Surgeon: Roscoe Villatoro MD;  Location: [facility];  Service: Orthopedics;  Laterality: Right;    HYSTERECTOMY      SKIN CANCER EXCISION        Family History   Problem Relation Age of Onset    Alzheimer's disease Mother     Diabetes Mother     Breast cancer Sister     Heart attack Brother     Alzheimer's disease Maternal Grandmother     Coronary artery disease Sister     Diabetes Sister     Valvular heart disease Sister     Lung disease Brother     Melanoma Brother     No Known Problems Brother      Social History     Socioeconomic History    Marital status:     Number of children: 2   Tobacco Use    Smoking status: Former     Packs/day: 1.00     Years: 50.00     Pack years: 50.00     Types: Cigarettes     Quit date: 2009     Years since quittin.1    Smokeless tobacco: Never   Vaping Use    Vaping Use: Never used   Substance and Sexual Activity    Alcohol use: No    Drug use: Never    Sexual activity: Defer      Current Outpatient Medications on File Prior to Visit   Medication Sig Dispense Refill    acetaminophen (TYLENOL) 325 MG tablet Take 2 tablets by mouth Every 4 (Four) Hours As Needed for Mild Pain.      albuterol sulfate  (90 Base) MCG/ACT inhaler Inhale 2 puffs Every 4 (Four) Hours As Needed for Wheezing or Shortness of Air.      apixaban (ELIQUIS) 2.5 MG tablet tablet Take 1 tablet by mouth Every 12 (Twelve) Hours. Indications: Other - full anticoagulation, Prevention of Unwanted Clot in Veins, and hx of CVA with + PFO      atorvastatin (LIPITOR) 40 MG tablet Take 2 tablets by mouth Daily.      calcium carbonate (OS-VU) 600 MG tablet Take 2 tablets by mouth Daily. OTC      colestipol (COLESTID) 1 g tablet Take 1 tablet by mouth 2 (Two) Times a Day.      diazePAM (VALIUM) 2 MG tablet Take 1 tablet by mouth At Night As Needed for Anxiety.      ferrous sulfate 325 (65 FE) MG tablet Take 1 tablet by mouth Daily With Breakfast.      gemfibrozil (LOPID) 600 MG tablet  Take 1 tablet by mouth 2 (Two) Times a Day.      Loratadine 10 MG capsule Take  by mouth.      memantine (NAMENDA) 5 MG tablet Take 2 tablets by mouth 2 (Two) Times a Day.      metFORMIN ER (GLUCOPHAGE-XR) 500 MG 24 hr tablet Take 4 tablets by mouth Daily With Breakfast. 4 tabs      metoprolol tartrate (LOPRESSOR) 50 MG tablet Take 1 tablet by mouth Every 12 (Twelve) Hours.      pantoprazole (PROTONIX) 40 MG EC tablet Take 1 tablet by mouth Every Morning.      polyethylene glycol (MIRALAX) 17 g packet Take 17 g by mouth Daily As Needed (Use if senna-docusate is ineffective).      sennosides-docusate (PERICOLACE) 8.6-50 MG per tablet Take 2 tablets by mouth 2 (Two) Times a Day.      sertraline (ZOLOFT) 50 MG tablet Take 1 tablet by mouth Daily.      timolol (TIMOPTIC) 0.5 % ophthalmic solution Administer 1 drop to both eyes Every Night.      [DISCONTINUED] atorvastatin (LIPITOR) 80 MG tablet Take 1 tablet by mouth Every Night. 30 tablet 2    [DISCONTINUED] cetirizine (zyrTEC) 5 MG tablet Take 1 tablet by mouth Daily. OTC (Patient not taking: Reported on 7/26/2023)      [DISCONTINUED] Insulin Lispro (humaLOG) 100 UNIT/ML injection Inject 2-7 Units under the skin into the appropriate area as directed 4 (Four) Times a Day Before Meals & at Bedtime. (Patient not taking: Reported on 7/26/2023)  12    [DISCONTINUED] memantine (NAMENDA) 10 MG tablet Take 1 tablet by mouth 2 (Two) Times a Day.      [DISCONTINUED] oxyCODONE (ROXICODONE) 5 MG immediate release tablet Take 1 tablet by mouth Every 4 (Four) Hours As Needed for Moderate Pain. (Patient not taking: Reported on 7/26/2023)  0     No current facility-administered medications on file prior to visit.      Allergies   Allergen Reactions    Morphine Mental Status Change and Delirium    Sulfa Antibiotics Unknown (See Comments)     Unknown         Review of Systems   Constitutional:  Negative for activity change, appetite change, chills, diaphoresis, fatigue, fever and  unexpected weight change.   HENT:  Negative for congestion, dental problem, drooling, ear discharge, ear pain, facial swelling, hearing loss, mouth sores, nosebleeds, postnasal drip, rhinorrhea, sinus pressure, sneezing, sore throat, tinnitus, trouble swallowing and voice change.    Eyes:  Negative for photophobia, pain, discharge, redness, itching and visual disturbance.   Respiratory:  Negative for apnea, cough, choking, chest tightness, shortness of breath, wheezing and stridor.    Cardiovascular:  Negative for chest pain, palpitations and leg swelling.   Gastrointestinal:  Negative for abdominal distention, abdominal pain, anal bleeding, blood in stool, constipation, diarrhea, nausea, rectal pain and vomiting.   Endocrine: Negative for cold intolerance, heat intolerance, polydipsia, polyphagia and polyuria.   Genitourinary:  Negative for decreased urine volume, difficulty urinating, dysuria, enuresis, flank pain, frequency, genital sores, hematuria and urgency.   Musculoskeletal:  Positive for arthralgias. Negative for back pain, gait problem, joint swelling, myalgias, neck pain and neck stiffness.   Skin:  Negative for color change, pallor, rash and wound.   Allergic/Immunologic: Negative for environmental allergies, food allergies and immunocompromised state.   Neurological:  Negative for dizziness, tremors, seizures, syncope, facial asymmetry, speech difficulty, weakness, light-headedness, numbness and headaches.   Hematological:  Negative for adenopathy. Does not bruise/bleed easily.   Psychiatric/Behavioral:  Negative for agitation, behavioral problems, confusion, decreased concentration, dysphoric mood, hallucinations, self-injury, sleep disturbance and suicidal ideas. The patient is not nervous/anxious and is not hyperactive.       Objective      Physical Exam  Temp 97.1 °F (36.2 °C)   LMP  (LMP Unknown)     There is no height or weight on file to calculate BMI.    General:   Mental Status:   Alert   Appearance: Cooperative, in no acute distress   Build and Nutrition: Thin, deconditioned female   Orientation: Alert and oriented to person, place and time   Posture: Normal   Gait: Sitting in a wheelchair    Integument:   Right hip: Wounds are well-healed with no signs of infection    Lower Extremity:   Right Hip:    Tenderness:  None    Swelling:  None    Crepitus:  None    Range of motion:  External Rotation: 30°       Internal Rotation: 30°       Flexion:  100°       Extension:  0°    Deformities:  None  Functional testing: Negative Stinchfield    No leg length discrepancy      Imaging/Studies  Imaging Results (Last 24 Hours)       Procedure Component Value Units Date/Time    XR Femur 2 View Right [[id number]] Resulted: 07/26/23 1008     Updated: 07/26/23 1010    Narrative:      Right Femur Radiographs  Indication: Follow-up intertrochanteric fracture, right hip status post   intramedullary fixation  Views: AP and lateral views of the right femur    Comparison: 6/19/2023 and 6/20/2023    Findings:  Fracture is well aligned, with good initial signs of healing, with no   signs of hardware loosening or failure.                Assessment and Plan     Diagnoses and all orders for this visit:    1. Status post hip surgery (Primary)  -     Cancel: XR Hip With or Without Pelvis 2 - 3 View Right  -     XR Femur 2 View Right    2. Closed fracture of right hip with routine healing, subsequent encounter        1. Status post hip surgery    2. Closed fracture of right hip with routine healing, subsequent encounter        I reviewed my findings with the patient.  Her right hip intertrochanteric fracture appears to be healing well, and I will see her back in 2 months with an x-ray.  I will see her back sooner for any problems.  Continue weightbearing as tolerated with a walker.    Return in about 2 months (around 9/26/2023) for Recheck with X-Rays.      Roscoe Villatoro MD  07/26/23  10:15 EDT

## 2023-09-27 ENCOUNTER — OFFICE VISIT (OUTPATIENT)
Dept: ORTHOPEDIC SURGERY | Facility: CLINIC | Age: 78
End: 2023-09-27
Payer: MEDICARE

## 2023-09-27 DIAGNOSIS — Z98.890 STATUS POST HIP SURGERY: Primary | ICD-10-CM

## 2023-09-27 DIAGNOSIS — S72.001D CLOSED FRACTURE OF RIGHT HIP WITH ROUTINE HEALING, SUBSEQUENT ENCOUNTER: ICD-10-CM

## 2023-09-27 NOTE — PROGRESS NOTES
[facility] [place]    Subjective     Chief Complaint   Patient presents with    Post-op     2 month status post -- status post Hip Trochanteric Nailing With Intramedullary Hip Screw Right 6/20/23        HPI    It has been 2  month(s) since Ms. Neil's last visit. She returns to clinic today for postoperative follow-up of right hip pain. The issue has been ongoing for 4 month(s). She rates her pain a 1/10 on the pain scale. Previous/current treatments: cane/walker, physical therapy, and weight loss. Current symptoms: stiffness and numbness and tingling . The pain is worse with lying on affected side; sitting improve the pain. Overall, she is doing better.  Ambulatory with the aid of a walker primarily for safety.    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)    Dementia without behavioral disturbance    Closed fracture of right hip, initial encounter    Severe malnutrition     Past Medical History:   Diagnosis Date    Anxiety     Blindness of right eye     Post trauma    Colon polyp     COPD (chronic obstructive pulmonary disease)     CVA (cerebral vascular accident) 06/04/2019    Dementia     Depression     Diabetes mellitus     Dyslipidemia     History of irregular heartbeat     Ulcerative colitis       Past Surgical History:   Procedure Laterality Date    COLONOSCOPY W/ POLYPECTOMY  2018    GALLBLADDER SURGERY      HIP TROCHANTERIC NAILING WITH INTRAMEDULLARY HIP SCREW Right 6/20/2023    Procedure: HIP  TROCHANTERIC NAILING WITH INTRAMEDULLARY HIP SCREW RIGHT;  Surgeon: Roscoe Villatoro MD;  Location: [facility];  Service: Orthopedics;  Laterality: Right;    HYSTERECTOMY      SKIN CANCER EXCISION        Family History   Problem Relation Age of Onset    Alzheimer's disease Mother     Diabetes Mother     Breast cancer Sister     Heart attack Brother     Alzheimer's disease Maternal Grandmother     Coronary artery disease Sister     Diabetes Sister     Valvular heart disease Sister     Lung disease Brother     Melanoma Brother     No Known Problems Brother      Social History     Socioeconomic History    Marital status:     Number of children: 2   Tobacco Use    Smoking status: Former     Packs/day: 1.00     Years: 50.00     Pack years: 50.00     Types: Cigarettes     Quit date: 2009     Years since quittin.3    Smokeless tobacco: Never   Vaping Use    Vaping Use: Never used   Substance and Sexual Activity    Alcohol use: No    Drug use: Never    Sexual activity: Defer      Current Outpatient Medications on File Prior to Visit   Medication Sig Dispense Refill    acetaminophen (TYLENOL) 325 MG tablet Take 2 tablets by mouth Every 4 (Four) Hours As Needed for Mild Pain.      albuterol sulfate  (90 Base) MCG/ACT inhaler Inhale 2 puffs Every 4 (Four) Hours As Needed for Wheezing or Shortness of Air.      apixaban (ELIQUIS) 2.5 MG tablet tablet Take 1 tablet by mouth Every 12 (Twelve) Hours. Indications: Other - full anticoagulation, Prevention of Unwanted Clot in Veins, and hx of CVA with + PFO      atorvastatin (LIPITOR) 40 MG tablet Take 2 tablets by mouth Daily.      calcium carbonate (OS-VU) 600 MG tablet Take 2 tablets by mouth Daily. OTC      colestipol (COLESTID) 1 g tablet Take 1 tablet by mouth 2 (Two) Times a Day.      diazePAM (VALIUM) 2 MG tablet Take 1 tablet by mouth At Night As Needed for Anxiety.      ferrous sulfate 325 (65 FE) MG tablet Take 1 tablet by mouth Daily With Breakfast.       gemfibrozil (LOPID) 600 MG tablet Take 1 tablet by mouth 2 (Two) Times a Day.      Loratadine 10 MG capsule Take  by mouth.      memantine (NAMENDA) 5 MG tablet Take 2 tablets by mouth 2 (Two) Times a Day.      metFORMIN ER (GLUCOPHAGE-XR) 500 MG 24 hr tablet Take 4 tablets by mouth Daily With Breakfast. 4 tabs      metoprolol tartrate (LOPRESSOR) 50 MG tablet Take 1 tablet by mouth Every 12 (Twelve) Hours.      pantoprazole (PROTONIX) 40 MG EC tablet Take 1 tablet by mouth Every Morning.      polyethylene glycol (MIRALAX) 17 g packet Take 17 g by mouth Daily As Needed (Use if senna-docusate is ineffective).      sennosides-docusate (PERICOLACE) 8.6-50 MG per tablet Take 2 tablets by mouth 2 (Two) Times a Day.      sertraline (ZOLOFT) 50 MG tablet Take 1 tablet by mouth Daily.      timolol (TIMOPTIC) 0.5 % ophthalmic solution Administer 1 drop to both eyes Every Night.       No current facility-administered medications on file prior to visit.      Allergies   Allergen Reactions    Morphine Mental Status Change and Delirium    Sulfa Antibiotics Unknown (See Comments)     Unknown         Review of Systems   Constitutional:  Negative for activity change, appetite change, chills, diaphoresis, fatigue, fever and unexpected weight change.   HENT:  Negative for congestion, dental problem, drooling, ear discharge, ear pain, facial swelling, hearing loss, mouth sores, nosebleeds, postnasal drip, rhinorrhea, sinus pressure, sneezing, sore throat, tinnitus, trouble swallowing and voice change.    Eyes:  Negative for photophobia, pain, discharge, redness, itching and visual disturbance.   Respiratory:  Negative for apnea, cough, choking, chest tightness, shortness of breath, wheezing and stridor.    Cardiovascular:  Negative for chest pain, palpitations and leg swelling.   Gastrointestinal:  Negative for abdominal distention, abdominal pain, anal bleeding, blood in stool, constipation, diarrhea, nausea, rectal pain and  vomiting.   Endocrine: Negative for cold intolerance, heat intolerance, polydipsia, polyphagia and polyuria.   Genitourinary:  Negative for decreased urine volume, difficulty urinating, dysuria, enuresis, flank pain, frequency, genital sores, hematuria and urgency.   Musculoskeletal:  Positive for arthralgias. Negative for back pain, gait problem, joint swelling, myalgias, neck pain and neck stiffness.   Skin:  Negative for color change, pallor, rash and wound.   Allergic/Immunologic: Negative for environmental allergies, food allergies and immunocompromised state.   Neurological:  Negative for dizziness, tremors, seizures, syncope, facial asymmetry, speech difficulty, weakness, light-headedness, numbness and headaches.   Hematological:  Negative for adenopathy. Does not bruise/bleed easily.   Psychiatric/Behavioral:  Negative for agitation, behavioral problems, confusion, decreased concentration, dysphoric mood, hallucinations, self-injury, sleep disturbance and suicidal ideas. The patient is not nervous/anxious and is not hyperactive.       Objective      Physical Exam  LMP  (LMP Unknown)     There is no height or weight on file to calculate BMI.    General:   Mental Status:  Alert   Appearance: Cooperative, in no acute distress   Build and Nutrition: Thin, deconditioned female   Orientation: Alert and oriented to person, place and time   Posture: Normal   Gait: With a walker, nonantalgic    Integument:              Right hip: Wounds are well-healed with no signs of infection     Lower Extremity:              Right Hip:                          Tenderness:    None                          Swelling:          None                          Crepitus:          None                          Range of motion:        External Rotation:       40°, no pain                                                              Internal Rotation:        40°, no pain                                                              Flexion:                        110°                                                              Extension:                   0°                       Deformities:     None  Functional testing: Negative Stinchfield                          No leg length discrepancy    Imaging/Studies  Imaging Results (Last 24 Hours)       Procedure Component Value Units Date/Time    XR Femur 2 View Right [[id number]] Resulted: 09/27/23 1509     Updated: 09/27/23 1510    Narrative:      Right Femur Radiographs  Indication: Follow-up intertrochanteric fracture, right hip  Views: AP and lateral views of the right femur    Comparison: 6/20/2023 and 7/26/2023    Findings:  Good alignment of the fracture, with no signs of hardware loosening or   failure.  Good signs of healing.  No unusual bony features.                Assessment and Plan     Diagnoses and all orders for this visit:    1. Status post hip surgery (Primary)  -     XR Femur 2 View Right    2. Closed fracture of right hip with routine healing, subsequent encounter        1. Status post hip surgery    2. Closed fracture of right hip with routine healing, subsequent encounter        I reviewed my findings with the patient.  Her fracture is healing well, and I will see her back in 3 months for what may be a final checkup with x-rays.  I will see her back sooner for any problems.    Return in about 3 months (around 12/27/2023) for Recheck with X-Rays.      Roscoe Villatoro MD  09/27/23  15:17 EDT

## 2024-01-03 ENCOUNTER — OFFICE VISIT (OUTPATIENT)
Dept: ORTHOPEDIC SURGERY | Facility: CLINIC | Age: 79
End: 2024-01-03
Payer: MEDICARE

## 2024-01-03 VITALS
WEIGHT: 110 LBS | HEIGHT: 65 IN | SYSTOLIC BLOOD PRESSURE: 168 MMHG | DIASTOLIC BLOOD PRESSURE: 74 MMHG | BODY MASS INDEX: 18.33 KG/M2

## 2024-01-03 DIAGNOSIS — S72.001D CLOSED FRACTURE OF RIGHT HIP WITH ROUTINE HEALING, SUBSEQUENT ENCOUNTER: Primary | ICD-10-CM

## 2024-01-03 DIAGNOSIS — Z98.890 STATUS POST HIP SURGERY: ICD-10-CM

## 2024-01-03 NOTE — PROGRESS NOTES
[facility] [place] [facility] [place] [facility] [place]    Subjective     Chief Complaint   Patient presents with    Follow-up     3 month f/u; status post Hip Trochanteric Nailing With Intramedullary Hip Screw Right 6/20/23        HPI    It has been 3  month(s) since Ms. Neil's last visit. She returns to clinic today for follow-up of right hip arthroplasty. The issue has been ongoing for 7 month(s). She rates her pain a 0/10 on the pain scale. Previous/current treatments: cane/walker and NSAIDS. Current symptoms:  nothing . The pain is worse with sleeping and lying on affected side; pain medication and/or NSAID improve the pain. Overall, she is doing better.  No complaints.  Ambulating with the aid of a walker, but she can walk without it.    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)    Dementia without behavioral disturbance    Closed fracture of right hip, initial encounter    Severe malnutrition     Past Medical History:   Diagnosis Date    Anxiety     Blindness of right eye     Post trauma    Colon polyp     COPD (chronic obstructive pulmonary disease)     CVA (cerebral vascular accident) 06/04/2019    Dementia     Depression     Diabetes mellitus     Dyslipidemia     History of irregular heartbeat     Ulcerative colitis       Past Surgical History:   Procedure Laterality Date    COLONOSCOPY W/ POLYPECTOMY  2018    GALLBLADDER SURGERY      HIP TROCHANTERIC NAILING WITH INTRAMEDULLARY HIP SCREW Right 6/20/2023    Procedure: HIP TROCHANTERIC  NAILING WITH INTRAMEDULLARY HIP SCREW RIGHT;  Surgeon: Roscoe Villatoro MD;  Location: [facility];  Service: Orthopedics;  Laterality: Right;    HYSTERECTOMY      SKIN CANCER EXCISION        Family History   Problem Relation Age of Onset    Alzheimer's disease Mother     Diabetes Mother     Breast cancer Sister     Heart attack Brother     Alzheimer's disease Maternal Grandmother     Coronary artery disease Sister     Diabetes Sister     Valvular heart disease Sister     Lung disease Brother     Melanoma Brother     No Known Problems Brother      Social History     Socioeconomic History    Marital status:     Number of children: 2   Tobacco Use    Smoking status: Former     Packs/day: 1.00     Years: 50.00     Additional pack years: 0.00     Total pack years: 50.00     Types: Cigarettes     Quit date: 2009     Years since quittin.5    Smokeless tobacco: Never   Vaping Use    Vaping Use: Never used   Substance and Sexual Activity    Alcohol use: No    Drug use: Never    Sexual activity: Defer      Current Outpatient Medications on File Prior to Visit   Medication Sig Dispense Refill    acetaminophen (TYLENOL) 325 MG tablet Take 2 tablets by mouth Every 4 (Four) Hours As Needed for Mild Pain.      albuterol sulfate  (90 Base) MCG/ACT inhaler Inhale 2 puffs Every 4 (Four) Hours As Needed for Wheezing or Shortness of Air.      apixaban (ELIQUIS) 2.5 MG tablet tablet Take 1 tablet by mouth Every 12 (Twelve) Hours. Indications: Other - full anticoagulation, Prevention of Unwanted Clot in Veins, and hx of CVA with + PFO      atorvastatin (LIPITOR) 40 MG tablet Take 2 tablets by mouth Daily.      calcium carbonate (OS-VU) 600 MG tablet Take 2 tablets by mouth Daily. OTC      colestipol (COLESTID) 1 g tablet Take 1 tablet by mouth 2 (Two) Times a Day.      diazePAM (VALIUM) 2 MG tablet Take 1 tablet by mouth At Night As Needed for Anxiety.      ferrous sulfate 325 (65 FE) MG tablet Take 1 tablet by  mouth Daily With Breakfast.      gemfibrozil (LOPID) 600 MG tablet Take 1 tablet by mouth 2 (Two) Times a Day.      Loratadine 10 MG capsule Take  by mouth.      memantine (NAMENDA) 5 MG tablet Take 2 tablets by mouth 2 (Two) Times a Day.      metFORMIN ER (GLUCOPHAGE-XR) 500 MG 24 hr tablet Take 4 tablets by mouth Daily With Breakfast. 4 tabs      metoprolol tartrate (LOPRESSOR) 50 MG tablet Take 1 tablet by mouth Every 12 (Twelve) Hours.      pantoprazole (PROTONIX) 40 MG EC tablet Take 1 tablet by mouth Every Morning.      polyethylene glycol (MIRALAX) 17 g packet Take 17 g by mouth Daily As Needed (Use if senna-docusate is ineffective).      sennosides-docusate (PERICOLACE) 8.6-50 MG per tablet Take 2 tablets by mouth 2 (Two) Times a Day.      sertraline (ZOLOFT) 50 MG tablet Take 1 tablet by mouth Daily.      timolol (TIMOPTIC) 0.5 % ophthalmic solution Administer 1 drop to both eyes Every Night.       No current facility-administered medications on file prior to visit.      Allergies   Allergen Reactions    Morphine Mental Status Change and Delirium    Sulfa Antibiotics Unknown (See Comments)     Unknown         Review of Systems   Constitutional:  Negative for activity change, appetite change, chills, diaphoresis, fatigue, fever and unexpected weight change.   HENT:  Negative for congestion, dental problem, drooling, ear discharge, ear pain, facial swelling, hearing loss, mouth sores, nosebleeds, postnasal drip, rhinorrhea, sinus pressure, sneezing, sore throat, tinnitus, trouble swallowing and voice change.    Eyes:  Negative for photophobia, pain, discharge, redness, itching and visual disturbance.   Respiratory:  Negative for apnea, cough, choking, chest tightness, shortness of breath, wheezing and stridor.    Cardiovascular:  Negative for chest pain, palpitations and leg swelling.   Gastrointestinal:  Negative for abdominal distention, abdominal pain, anal bleeding, blood in stool, constipation, diarrhea,  "nausea, rectal pain and vomiting.   Endocrine: Negative for cold intolerance, heat intolerance, polydipsia, polyphagia and polyuria.   Genitourinary:  Negative for decreased urine volume, difficulty urinating, dysuria, enuresis, flank pain, frequency, genital sores, hematuria and urgency.   Musculoskeletal:  Positive for arthralgias. Negative for back pain, gait problem, joint swelling, myalgias, neck pain and neck stiffness.   Skin:  Negative for color change, pallor, rash and wound.   Allergic/Immunologic: Negative for environmental allergies, food allergies and immunocompromised state.   Neurological:  Negative for dizziness, tremors, seizures, syncope, facial asymmetry, speech difficulty, weakness, light-headedness, numbness and headaches.   Hematological:  Negative for adenopathy. Does not bruise/bleed easily.   Psychiatric/Behavioral:  Negative for agitation, behavioral problems, confusion, decreased concentration, dysphoric mood, hallucinations, self-injury, sleep disturbance and suicidal ideas. The patient is not nervous/anxious and is not hyperactive.         Objective      Physical Exam  /74   Ht 165.1 cm (65\")   Wt 49.9 kg (110 lb)   LMP  (LMP Unknown)   BMI 18.30 kg/m²     Body mass index is 18.3 kg/m².    General:   Mental Status:  Alert   Appearance: Cooperative, in no acute distress   Build and Nutrition: Thin, deconditioned female   Orientation: Alert and oriented to person, place and time   Posture: Normal   Gait: Nonantalgic    Integument:              Right hip: Wounds are well-healed with no signs of infection     Lower Extremity:              Right Hip:                          Tenderness:    None                          Swelling:          None                          Crepitus:          None                          Range of motion:        External Rotation:       40°, no pain                                                              Internal Rotation:        40°, no pain          "                                                     Flexion:                       110°                                                              Extension:                   0°                       Deformities:     None  Functional testing: Negative Stinchfield                          No leg length discrepancy    Imaging/Studies  Imaging Results (Last 24 Hours)       Procedure Component Value Units Date/Time    XR Femur 2 View Right [[id number]] Resulted: 01/03/24 1414     Updated: 01/03/24 1415    Narrative:      Right Femur Radiographs  Indication: Follow-up right hip intertrochanteric fracture status post   trochanteric fixation nailing  Views: AP and lateral views of the right femur    Comparison: 9/27/2023    Findings:  Healed intertrochanteric fracture, right hip, with no signs of hardware   loosening or failure.  Good alignment.                Assessment and Plan     Diagnoses and all orders for this visit:    1. Closed fracture of right hip with routine healing, subsequent encounter (Primary)    2. Status post hip surgery  -     XR Femur 2 View Right        1. Closed fracture of right hip with routine healing, subsequent encounter    2. Status post hip surgery        I reviewed my findings with the patient.  Her right hip intertrochanteric fracture appears to be well-healed.  I will see her back in the future as needed.    Return if symptoms worsen or fail to improve.      Roscoe Villatoro MD  01/03/24  14:15 EST

## 2024-08-29 ENCOUNTER — TELEPHONE (OUTPATIENT)
Dept: ORTHOPEDIC SURGERY | Facility: CLINIC | Age: 79
End: 2024-08-29
Payer: COMMERCIAL

## 2024-08-29 NOTE — TELEPHONE ENCOUNTER
Spoke with patients daughter and scheduled an appointment with Opal sanchezow at [facility].

## 2024-08-29 NOTE — TELEPHONE ENCOUNTER
Caller: Delmi Bae    Relationship to patient: Emergency Contact    Best call back number: [phone]    Patient is needing: PATIENT IS IN A LOT OF PAIN AND FELT A POP IN HER HIP AND IT ALSO HAS KNOT - PLEASE REACH OUT AND ADVISE